# Patient Record
Sex: MALE | Race: ASIAN | NOT HISPANIC OR LATINO | Employment: FULL TIME | URBAN - METROPOLITAN AREA
[De-identification: names, ages, dates, MRNs, and addresses within clinical notes are randomized per-mention and may not be internally consistent; named-entity substitution may affect disease eponyms.]

---

## 2017-03-01 ENCOUNTER — COMMUNICATION - HEALTHEAST (OUTPATIENT)
Dept: INTERNAL MEDICINE | Facility: CLINIC | Age: 37
End: 2017-03-01

## 2017-03-08 ENCOUNTER — AMBULATORY - HEALTHEAST (OUTPATIENT)
Dept: LAB | Facility: CLINIC | Age: 37
End: 2017-03-08

## 2017-03-08 DIAGNOSIS — E55.9 VITAMIN D DEFICIENCY: ICD-10-CM

## 2017-03-10 ENCOUNTER — COMMUNICATION - HEALTHEAST (OUTPATIENT)
Dept: INTERNAL MEDICINE | Facility: CLINIC | Age: 37
End: 2017-03-10

## 2017-11-18 ENCOUNTER — RECORDS - HEALTHEAST (OUTPATIENT)
Dept: ADMINISTRATIVE | Facility: OTHER | Age: 37
End: 2017-11-18

## 2017-11-29 ENCOUNTER — OFFICE VISIT - HEALTHEAST (OUTPATIENT)
Dept: INTERNAL MEDICINE | Facility: CLINIC | Age: 37
End: 2017-11-29

## 2017-11-29 DIAGNOSIS — R07.9 CHEST PAIN: ICD-10-CM

## 2017-11-29 DIAGNOSIS — E78.1 PURE HYPERGLYCERIDEMIA: ICD-10-CM

## 2017-11-29 LAB
CHOLEST SERPL-MCNC: 223 MG/DL
FASTING STATUS PATIENT QL REPORTED: YES
HDLC SERPL-MCNC: 44 MG/DL
LDLC SERPL CALC-MCNC: 130 MG/DL
TRIGL SERPL-MCNC: 243 MG/DL

## 2017-11-29 ASSESSMENT — MIFFLIN-ST. JEOR: SCORE: 1643.82

## 2017-11-30 ENCOUNTER — COMMUNICATION - HEALTHEAST (OUTPATIENT)
Dept: INTERNAL MEDICINE | Facility: CLINIC | Age: 37
End: 2017-11-30

## 2017-11-30 DIAGNOSIS — E78.5 HYPERLIPIDEMIA: ICD-10-CM

## 2017-12-04 ENCOUNTER — COMMUNICATION - HEALTHEAST (OUTPATIENT)
Dept: INTERNAL MEDICINE | Facility: CLINIC | Age: 37
End: 2017-12-04

## 2017-12-04 DIAGNOSIS — E78.5 HYPERLIPIDEMIA: ICD-10-CM

## 2017-12-06 ENCOUNTER — HOSPITAL ENCOUNTER (OUTPATIENT)
Dept: NUCLEAR MEDICINE | Facility: CLINIC | Age: 37
Discharge: HOME | End: 2017-12-06
Attending: INTERNAL MEDICINE

## 2017-12-06 ENCOUNTER — HOSPITAL ENCOUNTER (OUTPATIENT)
Dept: CARDIOLOGY | Facility: CLINIC | Age: 37
Discharge: HOME | End: 2017-12-06
Attending: INTERNAL MEDICINE

## 2017-12-06 DIAGNOSIS — R07.9 CHEST PAIN: ICD-10-CM

## 2017-12-06 LAB
CV STRESS CURRENT BP HE: NORMAL
CV STRESS CURRENT HR HE: 100
CV STRESS CURRENT HR HE: 102
CV STRESS CURRENT HR HE: 102
CV STRESS CURRENT HR HE: 104
CV STRESS CURRENT HR HE: 108
CV STRESS CURRENT HR HE: 108
CV STRESS CURRENT HR HE: 115
CV STRESS CURRENT HR HE: 119
CV STRESS CURRENT HR HE: 120
CV STRESS CURRENT HR HE: 120
CV STRESS CURRENT HR HE: 124
CV STRESS CURRENT HR HE: 133
CV STRESS CURRENT HR HE: 140
CV STRESS CURRENT HR HE: 141
CV STRESS CURRENT HR HE: 142
CV STRESS CURRENT HR HE: 143
CV STRESS CURRENT HR HE: 149
CV STRESS CURRENT HR HE: 155
CV STRESS CURRENT HR HE: 155
CV STRESS CURRENT HR HE: 164
CV STRESS CURRENT HR HE: 166
CV STRESS CURRENT HR HE: 166
CV STRESS CURRENT HR HE: 171
CV STRESS CURRENT HR HE: 172
CV STRESS CURRENT HR HE: 172
CV STRESS CURRENT HR HE: 57
CV STRESS CURRENT HR HE: 63
CV STRESS CURRENT HR HE: 84
CV STRESS CURRENT HR HE: 91
CV STRESS CURRENT HR HE: 94
CV STRESS CURRENT HR HE: 99
CV STRESS DEVIATION TIME HE: NORMAL
CV STRESS ECHO PERCENT HR HE: NORMAL
CV STRESS EXERCISE STAGE HE: NORMAL
CV STRESS EXERCISE STAGE REACHED HE: NORMAL
CV STRESS FINAL RESTING BP HE: NORMAL
CV STRESS FINAL RESTING HR HE: 100
CV STRESS MAX HR HE: 172
CV STRESS MAX TREADMILL GRADE HE: 18
CV STRESS MAX TREADMILL SPEED HE: 5
CV STRESS PEAK DIA BP HE: NORMAL
CV STRESS PEAK SYS BP HE: NORMAL
CV STRESS PHASE HE: NORMAL
CV STRESS PROTOCOL HE: NORMAL
CV STRESS RESTING PT POSITION HE: NORMAL
CV STRESS ST DEVIATION AMOUNT HE: NORMAL
CV STRESS ST DEVIATION ELEVATION HE: NORMAL
CV STRESS ST EVELATION AMOUNT HE: NORMAL
CV STRESS TEST TYPE HE: NORMAL
CV STRESS TOTAL STAGE TIME MIN 1 HE: NORMAL
NUC STRESS EJECTION FRACTION: 70 %
STRESS ECHO BASELINE BP: NORMAL
STRESS ECHO BASELINE HR: 65
STRESS ECHO CALCULATED PERCENT HR: 95 %
STRESS ECHO LAST STRESS BP: NORMAL
STRESS ECHO LAST STRESS HR: 172
STRESS ECHO POST ESTIMATED WORKLOAD: 12.3
STRESS ECHO POST EXERCISE DUR MIN: 12
STRESS ECHO POST EXERCISE DUR SEC: 0
STRESS ECHO TARGET HR: 155

## 2017-12-07 ENCOUNTER — COMMUNICATION - HEALTHEAST (OUTPATIENT)
Dept: INTERNAL MEDICINE | Facility: CLINIC | Age: 37
End: 2017-12-07

## 2018-02-05 ENCOUNTER — COMMUNICATION - HEALTHEAST (OUTPATIENT)
Dept: INTERNAL MEDICINE | Facility: CLINIC | Age: 38
End: 2018-02-05

## 2018-02-05 DIAGNOSIS — E78.1 PURE HYPERGLYCERIDEMIA: ICD-10-CM

## 2018-02-16 ENCOUNTER — AMBULATORY - HEALTHEAST (OUTPATIENT)
Dept: LAB | Facility: CLINIC | Age: 38
End: 2018-02-16

## 2018-02-16 DIAGNOSIS — E78.5 HYPERLIPIDEMIA: ICD-10-CM

## 2018-02-16 LAB
ALBUMIN SERPL-MCNC: 4.4 G/DL (ref 3.5–5)
ALP SERPL-CCNC: 41 U/L (ref 45–120)
ALT SERPL W P-5'-P-CCNC: 24 U/L (ref 0–45)
AST SERPL W P-5'-P-CCNC: 17 U/L (ref 0–40)
BILIRUB DIRECT SERPL-MCNC: 0.2 MG/DL
BILIRUB SERPL-MCNC: 0.7 MG/DL (ref 0–1)
CHOLEST SERPL-MCNC: 151 MG/DL
FASTING STATUS PATIENT QL REPORTED: YES
HDLC SERPL-MCNC: 54 MG/DL
LDLC SERPL CALC-MCNC: 84 MG/DL
PROT SERPL-MCNC: 7.3 G/DL (ref 6–8)
TRIGL SERPL-MCNC: 64 MG/DL

## 2018-02-27 ENCOUNTER — COMMUNICATION - HEALTHEAST (OUTPATIENT)
Dept: INTERNAL MEDICINE | Facility: CLINIC | Age: 38
End: 2018-02-27

## 2018-11-19 ENCOUNTER — COMMUNICATION - HEALTHEAST (OUTPATIENT)
Dept: INTERNAL MEDICINE | Facility: CLINIC | Age: 38
End: 2018-11-19

## 2018-11-19 DIAGNOSIS — E78.1 PURE HYPERGLYCERIDEMIA: ICD-10-CM

## 2019-01-23 ENCOUNTER — COMMUNICATION - HEALTHEAST (OUTPATIENT)
Dept: INTERNAL MEDICINE | Facility: CLINIC | Age: 39
End: 2019-01-23

## 2019-01-23 DIAGNOSIS — E78.5 HYPERLIPIDEMIA: ICD-10-CM

## 2019-02-27 ENCOUNTER — COMMUNICATION - HEALTHEAST (OUTPATIENT)
Dept: INTERNAL MEDICINE | Facility: CLINIC | Age: 39
End: 2019-02-27

## 2019-02-27 DIAGNOSIS — E78.1 PURE HYPERGLYCERIDEMIA: ICD-10-CM

## 2019-06-13 ENCOUNTER — COMMUNICATION - HEALTHEAST (OUTPATIENT)
Dept: INTERNAL MEDICINE | Facility: CLINIC | Age: 39
End: 2019-06-13

## 2019-06-14 ENCOUNTER — COMMUNICATION - HEALTHEAST (OUTPATIENT)
Dept: INTERNAL MEDICINE | Facility: CLINIC | Age: 39
End: 2019-06-14

## 2019-06-14 DIAGNOSIS — E78.5 HYPERLIPIDEMIA: ICD-10-CM

## 2019-06-17 ENCOUNTER — COMMUNICATION - HEALTHEAST (OUTPATIENT)
Dept: INTERNAL MEDICINE | Facility: CLINIC | Age: 39
End: 2019-06-17

## 2019-06-17 DIAGNOSIS — E78.5 HYPERLIPIDEMIA: ICD-10-CM

## 2019-06-25 ENCOUNTER — OFFICE VISIT - HEALTHEAST (OUTPATIENT)
Dept: INTERNAL MEDICINE | Facility: CLINIC | Age: 39
End: 2019-06-25

## 2019-06-25 DIAGNOSIS — E55.9 VITAMIN D DEFICIENCY: ICD-10-CM

## 2019-06-25 DIAGNOSIS — Z23 NEED FOR VACCINATION: ICD-10-CM

## 2019-06-25 DIAGNOSIS — Z00.00 ROUTINE GENERAL MEDICAL EXAMINATION AT A HEALTH CARE FACILITY: ICD-10-CM

## 2019-06-25 DIAGNOSIS — E78.1 PURE HYPERGLYCERIDEMIA: ICD-10-CM

## 2019-06-25 LAB
ALBUMIN SERPL-MCNC: 4.4 G/DL (ref 3.5–5)
ALP SERPL-CCNC: 38 U/L (ref 45–120)
ALT SERPL W P-5'-P-CCNC: 35 U/L (ref 0–45)
ANION GAP SERPL CALCULATED.3IONS-SCNC: 13 MMOL/L (ref 5–18)
AST SERPL W P-5'-P-CCNC: 21 U/L (ref 0–40)
BILIRUB SERPL-MCNC: 0.6 MG/DL (ref 0–1)
BUN SERPL-MCNC: 22 MG/DL (ref 8–22)
CALCIUM SERPL-MCNC: 9.5 MG/DL (ref 8.5–10.5)
CHLORIDE BLD-SCNC: 106 MMOL/L (ref 98–107)
CHOLEST SERPL-MCNC: 218 MG/DL
CO2 SERPL-SCNC: 23 MMOL/L (ref 22–31)
CREAT SERPL-MCNC: 1.13 MG/DL (ref 0.7–1.3)
FASTING STATUS PATIENT QL REPORTED: YES
GFR SERPL CREATININE-BSD FRML MDRD: >60 ML/MIN/1.73M2
GLUCOSE BLD-MCNC: 100 MG/DL (ref 70–125)
HDLC SERPL-MCNC: 52 MG/DL
LDLC SERPL CALC-MCNC: 136 MG/DL
POTASSIUM BLD-SCNC: 4.2 MMOL/L (ref 3.5–5)
PROT SERPL-MCNC: 7.1 G/DL (ref 6–8)
SODIUM SERPL-SCNC: 142 MMOL/L (ref 136–145)
TRIGL SERPL-MCNC: 151 MG/DL
TSH SERPL DL<=0.005 MIU/L-ACNC: 0.96 UIU/ML (ref 0.3–5)

## 2019-06-25 ASSESSMENT — MIFFLIN-ST. JEOR: SCORE: 1666.5

## 2019-06-26 LAB
25(OH)D3 SERPL-MCNC: 33.1 NG/ML (ref 30–80)
25(OH)D3 SERPL-MCNC: 33.1 NG/ML (ref 30–80)

## 2019-06-28 ENCOUNTER — COMMUNICATION - HEALTHEAST (OUTPATIENT)
Dept: INTERNAL MEDICINE | Facility: CLINIC | Age: 39
End: 2019-06-28

## 2019-06-28 DIAGNOSIS — E78.5 HYPERLIPIDEMIA: ICD-10-CM

## 2019-06-28 DIAGNOSIS — E78.1 PURE HYPERGLYCERIDEMIA: ICD-10-CM

## 2019-07-02 ENCOUNTER — COMMUNICATION - HEALTHEAST (OUTPATIENT)
Dept: INTERNAL MEDICINE | Facility: CLINIC | Age: 39
End: 2019-07-02

## 2019-07-10 ENCOUNTER — COMMUNICATION - HEALTHEAST (OUTPATIENT)
Dept: INTERNAL MEDICINE | Facility: CLINIC | Age: 39
End: 2019-07-10

## 2020-06-30 ENCOUNTER — OFFICE VISIT - HEALTHEAST (OUTPATIENT)
Dept: INTERNAL MEDICINE | Facility: CLINIC | Age: 40
End: 2020-06-30

## 2020-06-30 DIAGNOSIS — E78.1 PURE HYPERGLYCERIDEMIA: ICD-10-CM

## 2020-06-30 DIAGNOSIS — R74.01 NONSPECIFIC ELEVATION OF LEVELS OF TRANSAMINASE OR LACTIC ACID DEHYDROGENASE (LDH): ICD-10-CM

## 2020-06-30 DIAGNOSIS — R74.02 NONSPECIFIC ELEVATION OF LEVELS OF TRANSAMINASE OR LACTIC ACID DEHYDROGENASE (LDH): ICD-10-CM

## 2020-06-30 RX ORDER — LORATADINE 10 MG/1
10 TABLET ORAL DAILY
Status: SHIPPED | COMMUNITY
Start: 2020-06-30 | End: 2022-03-24

## 2020-07-02 ENCOUNTER — AMBULATORY - HEALTHEAST (OUTPATIENT)
Dept: LAB | Facility: CLINIC | Age: 40
End: 2020-07-02

## 2020-07-02 DIAGNOSIS — R74.02 NONSPECIFIC ELEVATION OF LEVELS OF TRANSAMINASE OR LACTIC ACID DEHYDROGENASE (LDH): ICD-10-CM

## 2020-07-02 DIAGNOSIS — E78.1 PURE HYPERGLYCERIDEMIA: ICD-10-CM

## 2020-07-02 DIAGNOSIS — R74.01 NONSPECIFIC ELEVATION OF LEVELS OF TRANSAMINASE OR LACTIC ACID DEHYDROGENASE (LDH): ICD-10-CM

## 2020-07-02 LAB
ALBUMIN SERPL-MCNC: 4.7 G/DL (ref 3.5–5)
ALP SERPL-CCNC: 36 U/L (ref 45–120)
ALT SERPL W P-5'-P-CCNC: 68 U/L (ref 0–45)
AST SERPL W P-5'-P-CCNC: 31 U/L (ref 0–40)
BILIRUB DIRECT SERPL-MCNC: 0.3 MG/DL
BILIRUB SERPL-MCNC: 0.7 MG/DL (ref 0–1)
CHOLEST SERPL-MCNC: 164 MG/DL
FASTING STATUS PATIENT QL REPORTED: YES
HDLC SERPL-MCNC: 54 MG/DL
LDLC SERPL CALC-MCNC: 78 MG/DL
PROT SERPL-MCNC: 7.5 G/DL (ref 6–8)
TRIGL SERPL-MCNC: 159 MG/DL

## 2020-07-07 ENCOUNTER — COMMUNICATION - HEALTHEAST (OUTPATIENT)
Dept: INTERNAL MEDICINE | Facility: CLINIC | Age: 40
End: 2020-07-07

## 2020-08-29 ENCOUNTER — COMMUNICATION - HEALTHEAST (OUTPATIENT)
Dept: INTERNAL MEDICINE | Facility: CLINIC | Age: 40
End: 2020-08-29

## 2020-08-29 DIAGNOSIS — E78.5 HYPERLIPIDEMIA: ICD-10-CM

## 2020-09-01 RX ORDER — FENOFIBRATE 200 MG/1
200 CAPSULE ORAL
Qty: 90 CAPSULE | Refills: 3 | Status: SHIPPED | OUTPATIENT
Start: 2020-09-01 | End: 2021-07-07

## 2020-09-27 ENCOUNTER — COMMUNICATION - HEALTHEAST (OUTPATIENT)
Dept: INTERNAL MEDICINE | Facility: CLINIC | Age: 40
End: 2020-09-27

## 2020-09-27 DIAGNOSIS — E78.1 PURE HYPERGLYCERIDEMIA: ICD-10-CM

## 2020-09-30 RX ORDER — ROSUVASTATIN CALCIUM 10 MG/1
10 TABLET, COATED ORAL AT BEDTIME
Qty: 90 TABLET | Refills: 2 | Status: SHIPPED | OUTPATIENT
Start: 2020-09-30 | End: 2021-07-07

## 2021-02-11 ENCOUNTER — COMMUNICATION - HEALTHEAST (OUTPATIENT)
Dept: INTERNAL MEDICINE | Facility: CLINIC | Age: 41
End: 2021-02-11

## 2021-02-11 DIAGNOSIS — Z30.09 VASECTOMY EVALUATION: ICD-10-CM

## 2021-05-14 ENCOUNTER — RECORDS - HEALTHEAST (OUTPATIENT)
Dept: ADMINISTRATIVE | Facility: OTHER | Age: 41
End: 2021-05-14

## 2021-05-29 NOTE — TELEPHONE ENCOUNTER
Jared Matson for refill requested below?  Refill has been set up for you to review.  Eve KEARNEY, MORALES/CMT....................7:37 AM

## 2021-05-29 NOTE — TELEPHONE ENCOUNTER
You may give him 30 days of medication but he needs to have fasting follow-up with me before any further refills as he has not been seen in 2 years.

## 2021-05-30 NOTE — PROGRESS NOTES
Office Visit - Physical   Erich Pierce   38 y.o.  male    Date of visit: 6/25/2019  Physician: Damaso Harris MD     Assessment and Plan   1. Routine general medical examination at a health care facility  Due for labs today.  He lives a healthy lifestyle for the most part.  Urged increased exercise.  It is difficult for him with the kids though.  He is going to try to make it more of a priority.  - Lipid Cascade  - Comprehensive Metabolic Panel  - Thyroid Cascade  - Vitamin D, Total (25-Hydroxy)    2. Need for vaccination  Tetanus today updated.  - Tdap vaccine,  8yo or older,  IM    3. Essential Hypertriglyceridemia  Labs today for monitoring.  Continue dual therapy.  - Lipid Cascade  - Comprehensive Metabolic Panel  - Thyroid Cascade    4. Vitamin D deficiency    - Vitamin D, Total (25-Hydroxy)        Return in about 1 year (around 6/25/2020) for Annual physical.     Chief Complaint   Chief Complaint   Patient presents with     Annual Exam     pt fasting        Patient Profile   Social History     Social History Narrative     Not on file        Past Medical History   Patient Active Problem List   Diagnosis     Essential Hypertriglyceridemia     Serum Enzyme Levels - ALT (SGPT) Elevated     Multiple food allergies     Multiple environmental allergies     Vitamin D deficiency       Past Surgical History  He has a past surgical history that includes Skin graft (1990) and [place] tooth extraction (2006).     History of Present Illness   This 38 y.o. old  Tye comes in today for physical.  Reports is doing well.  There is been doing new home in [place].  He has 2 children, older daughter is 5 almost and her name is Dayana.  Youngest is a son and he will be two soon.  His name is [employer].  He continues to work for [employer].  He feels well.  Not exercising as much as he used to.  Nothing new in the family.  His sister lives in [place].  He states that she and him are very different people and do not contact each  "other much.  Mother is still in [place].  He rarely sees her as well.    His in-laws are the [employer].        Review of Systems: A comprehensive review of systems was negative except as noted.     Medications and Allergies   Current Outpatient Medications   Medication Sig Dispense Refill     aspirin 81 MG EC tablet Take 81 mg by mouth daily.       fenofibrate micronized (LOFIBRA) 200 MG capsule Take 1 capsule (200 mg total) by mouth daily before breakfast. 30 capsule 0     fluticasone (FLONASE) 50 mcg/actuation nasal spray 2 sprays.       rosuvastatin (CRESTOR) 10 MG tablet Take 1 tablet (10 mg total) by mouth at bedtime. 30 tablet 0     EPIPEN 2-JORGE 0.3 mg/0.3 mL atIn Inject into thigh as directed 1 Pre-filled Pen Syringe 1     No current facility-administered medications for this visit.      No Known Allergies     Family and Social History   Family History   Problem Relation Age of Onset     Leukemia Father          age 64 of acute myelogenous leukimia     Depression Mother      No Medical Problems Sister      No Medical Problems Daughter         Social History     Tobacco Use     Smoking status: Never Smoker     Smokeless tobacco: Never Used   Substance Use Topics     Alcohol use: Not on file     Drug use: Not on file        Physical Exam   General Appearance:   Pleasant young gentleman in no distress.  Looks well and in good spirits.    /70   Pulse 73   Ht 5' 7\" (1.702 m)   Wt 177 lb (80.3 kg)   SpO2 96%   BMI 27.72 kg/m      EYES: Eyelids, conjunctiva, and sclera were normal. Pupils were normal. Cornea, iris, and lens were normal bilaterally.  HEAD, EARS, NOSE, MOUTH, AND THROAT: Head and face were normal. Hearing was normal to voice and the ears were normal to external exam. Nose appearance was normal and there was no discharge. Oropharynx was normal.  NECK: Neck appearance was normal. There were no neck masses and the thyroid was not enlarged.  RESPIRATORY: Breathing pattern was normal " and the chest moved symmetrically.  Percussion/auscultatory percussion was normal.  Lung sounds were normal and there were no abnormal sounds.  CARDIOVASCULAR: Heart rate and rhythm were normal.  S1 and S2 were normal and there were no extra sounds or murmurs. Peripheral pulses in arms and legs were normal.  Jugular venous pressure was normal.  There was no peripheral edema.  GASTROINTESTINAL: The abdomen was normal in contour.  Bowel sounds were present.  Percussion detected no organ enlargement or tenderness.  Palpation detected no tenderness, mass, or enlarged organs.   MUSCULOSKELETAL: Skeletal configuration was normal and muscle mass was normal for age. Joint appearance was overall normal.  LYMPHATIC: There were no enlarged nodes.  SKIN/HAIR/NAILS: Skin color was normal.  There were no skin lesions.  Hair and nails were normal.  NEUROLOGIC: The patient was alert and oriented to person, place, time, and circumstance. Speech was normal. Cranial nerves were normal. Motor strength was normal for age. The patient was normally coordinated.  PSYCHIATRIC:  Mood and affect were normal and the patient had normal recent and remote memory. The patient's judgment and insight were normal.    ADDITIONAL VITAL SIGNS: none  CHEST WALL/BREASTS: nml    RECTAL: def  GENITAL/URINARY: nml penis and testes.      Additional Information        Damaso Harris MD  Internal Medicine  Contact me at [phone]

## 2021-05-30 NOTE — TELEPHONE ENCOUNTER
Refill Approved    Rx renewed per Medication Renewal Policy. Medication was last renewed on 2/27/19.    Janett Lam, [facility] Triage/Med Refill 6/29/2019     Requested Prescriptions   Pending Prescriptions Disp Refills     rosuvastatin (CRESTOR) 10 MG tablet 30 tablet 0     Sig: Take 1 tablet (10 mg total) by mouth at bedtime.       Statins Refill Protocol (Hmg CoA Reductase Inhibitors) Passed - 6/28/2019  7:55 PM        Passed - PCP or prescribing provider visit in past 12 months      Last office visit with prescriber/PCP: 11/29/2017 Damaso Harris MD OR same dept: Visit date not found OR same specialty: 11/29/2017 Damaso Harris MD  Last physical: 6/25/2019 Last MTM visit: Visit date not found   Next visit within 3 mo: Visit date not found  Next physical within 3 mo: Visit date not found  Prescriber OR PCP: Damaso Harris MD  Last diagnosis associated with med order: 1. Essential Hypertriglyceridemia  - rosuvastatin (CRESTOR) 10 MG tablet; Take 1 tablet (10 mg total) by mouth at bedtime.  Dispense: 30 tablet; Refill: 0    2. Hyperlipidemia  - fenofibrate micronized (LOFIBRA) 200 MG capsule; Take 1 capsule (200 mg total) by mouth daily before breakfast.  Dispense: 30 capsule; Refill: 0    If protocol passes may refill for 12 months if within 3 months of last provider visit (or a total of 15 months).             fenofibrate micronized (LOFIBRA) 200 MG capsule 30 capsule 0     Sig: Take 1 capsule (200 mg total) by mouth daily before breakfast.       Fenofibrate Refill Protocol Passed - 6/28/2019  7:55 PM        Passed - Renal status in last 6 months     Creatinine   Date Value Ref Range Status   06/25/2019 1.13 0.70 - 1.30 mg/dL Final             Passed - Fasting lipid cascade in last 12 months     Cholesterol   Date Value Ref Range Status   06/25/2019 218 (H) <=199 mg/dL Final     Triglycerides   Date Value Ref Range Status   06/25/2019 151 (H) <=149 mg/dL Final     HDL Cholesterol   Date Value Ref Range  Status   06/25/2019 52 >=40 mg/dL Final     LDL Calculated   Date Value Ref Range Status   06/25/2019 136 (H) <=129 mg/dL Final     Patient Fasting > 8hrs?   Date Value Ref Range Status   06/25/2019 Yes  Final             Passed - AST or ALT in last 12 months     AST   Date Value Ref Range Status   06/25/2019 21 0 - 40 U/L Final     ALT   Date Value Ref Range Status   06/25/2019 35 0 - 45 U/L Final               Passed - PCP or prescribing provider visit in past 12 months       Last office visit with prescriber/PCP: 11/29/2017 Damaso Harris MD OR same dept: Visit date not found OR same specialty: 11/29/2017 Damaso Harris MD  Last physical: 6/25/2019 Last MTM visit: Visit date not found   Next visit within 3 mo: Visit date not found  Next physical within 3 mo: Visit date not found  Prescriber OR PCP: Damaso Harris MD  Last diagnosis associated with med order: 1. Essential Hypertriglyceridemia  - rosuvastatin (CRESTOR) 10 MG tablet; Take 1 tablet (10 mg total) by mouth at bedtime.  Dispense: 30 tablet; Refill: 0    2. Hyperlipidemia  - fenofibrate micronized (LOFIBRA) 200 MG capsule; Take 1 capsule (200 mg total) by mouth daily before breakfast.  Dispense: 30 capsule; Refill: 0    If protocol passes may refill for 12 months if within 3 months of last provider visit (or a total of 15 months).

## 2021-05-31 VITALS — BODY MASS INDEX: 27 KG/M2 | HEIGHT: 67 IN | WEIGHT: 172 LBS

## 2021-06-03 ENCOUNTER — OFFICE VISIT - HEALTHEAST (OUTPATIENT)
Dept: FAMILY MEDICINE | Facility: CLINIC | Age: 41
End: 2021-06-03

## 2021-06-03 VITALS — BODY MASS INDEX: 27.78 KG/M2 | HEIGHT: 67 IN | WEIGHT: 177 LBS

## 2021-06-03 DIAGNOSIS — Z20.822 SUSPECTED COVID-19 VIRUS INFECTION: ICD-10-CM

## 2021-06-09 NOTE — PROGRESS NOTES
"Erich Pierce is a 39 y.o. male who is being evaluated via a billable video visit.      The patient has been notified of following:     \"This video visit will be conducted via a call between you and your physician/provider. We have found that certain health care needs can be provided without the need for an in-person physical exam.  This service lets us provide the care you need with a video conversation.  If a prescription is necessary we can send it directly to your pharmacy.  If lab work is needed we can place an order for that and you can then stop by our lab to have the test done at a later time.    Video visits are billed at different rates depending on your insurance coverage. Please reach out to your insurance provider with any questions.    If during the course of the call the physician/provider feels a video visit is not appropriate, you will not be charged for this service.\"    Patient has given verbal consent to a Video visit? Yes  How would you like to obtain your AVS? AVS Preference: [employer].  Patient would like the video invitation sent by: Text to cell phone: [phone]  Will anyone else be joining your video visit? No        Video Start Time: 0850    Additional provider notes:     Office Visit - Follow Up   Erich Pierce   39 y.o. male    Date of Visit: 6/30/2020    Chief Complaint   Patient presents with     Hyperlipidemia     Dox Video: [phone] med check - will likely need lab work done         Assessment and Plan   1. Essential Hypertriglyceridemia  Continue lifestyle modification and medications as prescribed.  He will return for labs.  - Lipid Cascade; Future  - Hepatic Profile; Future    2. Serum Enzyme Levels - ALT (SGPT) Elevated  As above.  I will see him back in 1 year assuming all is well.  - Lipid Cascade; Future  - Hepatic Profile; Future        Return in about 1 year (around 6/30/2021) for Annual physical.     History of Present Illness   This 39 y.o. old Tye joins me for video " visit today.  Reports has been doing well.  He and his wife are expecting their third.  The kids are in .  He and wife are both working from home.  Denies somatic concerns.  Remains on his lipid-lowering medications.  No side effects from them.    Review of Systems: A comprehensive review of systems was negative except as noted.     Medications, Allergies and Problem List   Reviewed, reconciled and updated  Post Discharge Medication Reconciliation Status:      Physical Exam   General Appearance:   Pleasant gentleman who looks his age or younger.    There were no vitals taken for this visit.         Additional Information   Current Outpatient Medications   Medication Sig Dispense Refill     cholecalciferol, vitamin D3, 125 mcg (5,000 unit) capsule Take 5,000 Units by mouth daily.       EPIPEN 2-JORGE 0.3 mg/0.3 mL atIn Inject into thigh as directed 1 Pre-filled Pen Syringe 1     fenofibrate micronized (LOFIBRA) 200 MG capsule Take 1 capsule (200 mg total) by mouth daily before breakfast. 90 capsule 3     loratadine (CLARITIN) 10 mg tablet Take 10 mg by mouth daily.       rosuvastatin (CRESTOR) 10 MG tablet Take 1 tablet (10 mg total) by mouth at bedtime. 90 tablet 3     No current facility-administered medications for this visit.      No Known Allergies  Social History     Tobacco Use     Smoking status: Never Smoker     Smokeless tobacco: Never Used   Substance Use Topics     Alcohol use: Not on file     Drug use: Not on file       Review and/or order of clinical lab tests:  Review and/or order of radiology tests:  Review and/or order of medicine tests:  Discussion of test results with performing physician:  Decision to obtain old records and/or obtain history from someone other than the patient:  Review and summarization of old records and/or obtaining history from someone other than the patient and.or discussion of case with another health care provider:  Independent visualization of image, tracing or  specimen itself:    Time: not applicable     Damaso Harris MD      Video-Visit Details    Type of service:  Video Visit    Video End Time (time video stopped): 0901  Originating Location (pt. Location): Home    Distant Location (provider location):  [facility] INTERNAL MEDICINE     Platform used for Video Visit: [facility]      Damaso Harris MD

## 2021-06-11 NOTE — TELEPHONE ENCOUNTER
RN cannot approve Refill Request    RN can NOT refill this medication Protocol failed and NO refill given. Last office visit: 11/29/2017 Damaso Harris MD Last Physical: 6/25/2019 Last MTM visit: Visit date not found Last visit same specialty: 11/29/2017 Damaso Harris MD.  Next visit within 3 mo: Visit date not found  Next physical within 3 mo: Visit date not found      Janett Lam, [facility] Triage/Med Refill 8/31/2020    Requested Prescriptions   Pending Prescriptions Disp Refills     fenofibrate micronized (LOFIBRA) 200 MG capsule [Pharmacy Med Name: Fenofibrate Micronized Oral Capsule 200 MG] 90 capsule 0     Sig: Take 1 capsule (200 mg total) by mouth daily before breakfast.       Fenofibrate Refill Protocol Failed - 8/29/2020  2:02 AM        Failed - Renal status in last 6 months     Creatinine   Date Value Ref Range Status   06/25/2019 1.13 0.70 - 1.30 mg/dL Final             Passed - Fasting lipid cascade in last 12 months     Cholesterol   Date Value Ref Range Status   07/02/2020 164 <=199 mg/dL Final     Triglycerides   Date Value Ref Range Status   07/02/2020 159 (H) <=149 mg/dL Final     HDL Cholesterol   Date Value Ref Range Status   07/02/2020 54 >=40 mg/dL Final     LDL Calculated   Date Value Ref Range Status   07/02/2020 78 <=129 mg/dL Final     Patient Fasting > 8hrs?   Date Value Ref Range Status   07/02/2020 Yes  Final             Passed - AST or ALT in last 12 months     AST   Date Value Ref Range Status   07/02/2020 31 0 - 40 U/L Final     ALT   Date Value Ref Range Status   07/02/2020 68 (H) 0 - 45 U/L Final               Passed - PCP or prescribing provider visit in past 12 months       Last office visit with prescriber/PCP: 11/29/2017 Damaso Harris MD OR same dept: Visit date not found OR same specialty: 11/29/2017 Damaso Harris MD  Last physical: 6/25/2019 Last MTM visit: Visit date not found   Next visit within 3 mo: Visit date not found  Next physical within 3 mo: Visit  date not found  Prescriber OR PCP: Damaso Harris MD  Last diagnosis associated with med order: 1. Hyperlipidemia  - fenofibrate micronized (LOFIBRA) 200 MG capsule [Pharmacy Med Name: Fenofibrate Micronized Oral Capsule 200 MG]; Take 1 capsule (200 mg total) by mouth daily before breakfast.  Dispense: 90 capsule; Refill: 0    If protocol passes may refill for 12 months if within 3 months of last provider visit (or a total of 15 months).

## 2021-06-11 NOTE — TELEPHONE ENCOUNTER
Refill Approved    Rx renewed per Medication Renewal Policy. Medication was last renewed on 6/29/9.    Janett Lam, [facility] Triage/Med Refill 9/30/2020     Requested Prescriptions   Pending Prescriptions Disp Refills     rosuvastatin (CRESTOR) 10 MG tablet [Pharmacy Med Name: Rosuvastatin Calcium Oral Tablet 10 MG] 90 tablet 0     Sig: Take 1 tablet (10 mg total) by mouth at bedtime.       Statins Refill Protocol (Hmg CoA Reductase Inhibitors) Passed - 9/27/2020 12:40 PM        Passed - PCP or prescribing provider visit in past 12 months      Last office visit with prescriber/PCP: 11/29/2017 Damaso Harris MD OR same dept: Visit date not found OR same specialty: 11/29/2017 Damaso Harris MD  Last physical: 6/25/2019 Last MTM visit: Visit date not found   Next visit within 3 mo: Visit date not found  Next physical within 3 mo: Visit date not found  Prescriber OR PCP: Damaso Harris MD  Last diagnosis associated with med order: 1. Essential Hypertriglyceridemia  - rosuvastatin (CRESTOR) 10 MG tablet [Pharmacy Med Name: Rosuvastatin Calcium Oral Tablet 10 MG]; Take 1 tablet (10 mg total) by mouth at bedtime.  Dispense: 90 tablet; Refill: 0    If protocol passes may refill for 12 months if within 3 months of last provider visit (or a total of 15 months).

## 2021-06-14 NOTE — PROGRESS NOTES
"  Office Visit - Follow Up   Erich Pierce   37 y.o. male    Date of Visit: 2017    Chief Complaint   Patient presents with     Hospital Visit Follow Up     ER f/u - no current ang since being home         Assessment and Plan   1. Chest pain  Chest pains in this gentleman with cardiac risk factors of severe hyper lipidemia.  He is on dual therapy for his hyperlipidemia.  We discussed that healthy diet today and I have suggested a stress Cardiolite for further evaluation to rule out cardiac cause of his chest pain.  If he has further episodes he is to let me know immediately.  - NM Exercise Stress Test; Future    2. Essential Hypertriglyceridemia  Labs today for monitoring.  - Lipid Cascade  - Comprehensive Metabolic Panel        Return if symptoms worsen or fail to improve.     History of Present Illness   This 37 y.o. old patient comes in today for follow-up as an urgent add-on for evaluation of chest pains that he had last week.  About 10 days ago he was bending over to  his  and developed substernal chest pain.  It lasted about 12 hours.  Went to urgent care where they apparently found some abnormalities of his T waves.  They sent to emergency room.  There he had an extensive evaluation including troponin which was negative and a normal EKG.  Also normal chest x-ray.  He was sent home.  He has had no further chest pains.  He has not been exercising but he has been active.  He denies any shortness of breath or other somatic concerns.    Review of Systems: A comprehensive review of systems was negative except as noted.     Medications, Allergies and Problem List   Reviewed and updated     Physical Exam   General Appearance:   Patient looks well.  No chest pain on palpation.  Heart is regular.    /72 (Patient Site: Right Arm, Patient Position: Sitting, Cuff Size: Adult Regular)  Pulse (!) 55  Ht 5' 7\" (1.702 m)  Wt 172 lb (78 kg)  SpO2 99%  BMI 26.94 kg/m2         Additional Information "   Current Outpatient Prescriptions   Medication Sig Dispense Refill     aspirin 81 MG EC tablet Take 81 mg by mouth daily.       EPIPEN 2-JORGE 0.3 mg/0.3 mL atIn Inject into thigh as directed 1 Pre-filled Pen Syringe 1     fenofibrate micronized (LOFIBRA) 200 MG capsule Take one capsule by mouth one time daily 90 capsule 3     fluticasone (FLONASE) 50 mcg/actuation nasal spray 2 sprays.       simvastatin (ZOCOR) 10 MG tablet TAKE ONE TABLET BY MOUTH ONE TIME DAILY 90 tablet 3     No current facility-administered medications for this visit.      No Known Allergies  Social History   Substance Use Topics     Smoking status: Never Smoker     Smokeless tobacco: None     Alcohol use None       Review and/or order of clinical lab tests:  Review and/or order of radiology tests:  Review and/or order of medicine tests:  Discussion of test results with performing physician:  Decision to obtain old records and/or obtain history from someone other than the patient:  Review and summarization of old records and/or obtaining history from someone other than the patient and.or discussion of case with another health care provider:  Independent visualization of image, tracing or specimen itself:    Time: total time spent with the patient was 15 minutes of which >50% was spent in counseling and coordination of care     Damaso Harris MD

## 2021-06-15 NOTE — TELEPHONE ENCOUNTER
Please place referral to urology.  Dr. Menjivar or Dr. Bertrand.  Diagnosis is desire for vasectomy.  Please instruct him on how to get this scheduled.  Thanks!

## 2021-06-16 PROBLEM — E55.9 VITAMIN D DEFICIENCY: Status: ACTIVE | Noted: 2019-06-25

## 2021-06-19 NOTE — LETTER
Letter by Damaso Harris MD at      Author: Damaso Harris MD Service: -- Author Type: --    Filed:  Encounter Date: 7/2/2019 Status: (Other)         Erich Pierce  [address] [facility] [address]             July 2, 2019         Dear Mr. Pierce,    Below are the results from your recent visit:    Resulted Orders   Lipid Cascade   Result Value Ref Range    Cholesterol 218 (H) <=199 mg/dL    Triglycerides 151 (H) <=149 mg/dL    HDL Cholesterol 52 >=40 mg/dL    LDL Calculated 136 (H) <=129 mg/dL    Patient Fasting > 8hrs? Yes    Comprehensive Metabolic Panel   Result Value Ref Range    Sodium 142 136 - 145 mmol/L    Potassium 4.2 3.5 - 5.0 mmol/L    Chloride 106 98 - 107 mmol/L    CO2 23 22 - 31 mmol/L    Anion Gap, Calculation 13 5 - 18 mmol/L    Glucose 100 70 - 125 mg/dL    BUN 22 8 - 22 mg/dL    Creatinine 1.13 0.70 - 1.30 mg/dL    GFR MDRD Af Amer >60 >60 mL/min/1.73m2    GFR MDRD Non Af Amer >60 >60 mL/min/1.73m2    Bilirubin, Total 0.6 0.0 - 1.0 mg/dL    Calcium 9.5 8.5 - 10.5 mg/dL    Protein, Total 7.1 6.0 - 8.0 g/dL    Albumin 4.4 3.5 - 5.0 g/dL    Alkaline Phosphatase 38 (L) 45 - 120 U/L    AST 21 0 - 40 U/L    ALT 35 0 - 45 U/L    Narrative    Fasting Glucose reference range is 70-99 mg/dL per  American Diabetes Association (ADA) guidelines.   Thyroid Cascade   Result Value Ref Range    TSH 0.96 0.30 - 5.00 uIU/mL   Vitamin D, Total (25-Hydroxy)   Result Value Ref Range    Vitamin D, Total (25-Hydroxy) 33.1 30.0 - 80.0 ng/mL    Narrative    Deficiency <10.0 ng/mL  Insufficiency 10.0-29.9 ng/mL  Sufficiency 30.0-80.0 ng/mL  Toxicity (possible) >100.0 ng/mL       Tye, your cholesterol is up, likely due to your dietary changes and lack of exercise.  Please try to be better about the diet, following a healthy Mediterranean diet.  Everything else here looks very good.     Please call with questions or contact us using [employer].    Sincerely,        Electronically signed by Damaso Harris MD

## 2021-06-20 NOTE — LETTER
Letter by Damaso Harris MD at      Author: Damaso Harris MD Service: -- Author Type: --    Filed:  Encounter Date: 7/7/2020 Status: (Other)         Erich Pierce  [address] [facility] [address]             July 7, 2020         Dear Mr. Pierce,    Below are the results from your recent visit:    Resulted Orders   Lipid Cascade   Result Value Ref Range    Cholesterol 164 <=199 mg/dL    Triglycerides 159 (H) <=149 mg/dL    HDL Cholesterol 54 >=40 mg/dL    LDL Calculated 78 <=129 mg/dL    Patient Fasting > 8hrs? Yes    Hepatic Profile   Result Value Ref Range    Bilirubin, Total 0.7 0.0 - 1.0 mg/dL    Bilirubin, Direct 0.3 <=0.5 mg/dL    Protein, Total 7.5 6.0 - 8.0 g/dL    Albumin 4.7 3.5 - 5.0 g/dL    Alkaline Phosphatase 36 (L) 45 - 120 U/L    AST 31 0 - 40 U/L    ALT 68 (H) 0 - 45 U/L     Cholesterol looks great.  One minimally elevated liver enzyme not concerning.     Please call with questions or contact us using [employer].    Sincerely,        Electronically signed by Damaso Harris MD

## 2021-06-23 NOTE — TELEPHONE ENCOUNTER
RN cannot approve Refill Request    RN can NOT refill this medication Protocol failed and NO refill given. Last office visit: 11/29/2017 Damaso Harris MD Last Physical: Visit date not found Last MTM visit: Visit date not found Last visit same specialty: 11/29/2017 Damaso Harris MD.  Next visit within 3 mo: Visit date not found  Next physical within 3 mo: Visit date not found      Neetu Ibarra, [facility] Triage/Med Refill 1/25/2019    Requested Prescriptions   Pending Prescriptions Disp Refills     fenofibrate micronized (LOFIBRA) 200 MG capsule [Pharmacy Med Name: FENOFIBRATE 200 MG CAPSULE] 90 capsule 2     Sig: TAKE 1 CAPSULE (200 MG TOTAL) BY MOUTH DAILY BEFORE BREAKFAST.    Fenofibrate Refill Protocol Failed - 1/23/2019  8:25 AM       Failed - Renal status in last 6 months    Creatinine   Date Value Ref Range Status   11/29/2017 1.14 0.70 - 1.30 mg/dL Final            Failed - PCP or prescribing provider visit in past 12 months      Last office visit with prescriber/PCP: 11/29/2017 Damaso Harris MD OR same dept: Visit date not found OR same specialty: 11/29/2017 Damaso Harris MD  Last physical: Visit date not found Last MTM visit: Visit date not found   Next visit within 3 mo: Visit date not found  Next physical within 3 mo: Visit date not found  Prescriber OR PCP: Damaso Harris MD  Last diagnosis associated with med order: 1. Hyperlipidemia  - fenofibrate micronized (LOFIBRA) 200 MG capsule [Pharmacy Med Name: FENOFIBRATE 200 MG CAPSULE]; Take 1 capsule (200 mg total) by mouth daily before breakfast.  Dispense: 90 capsule; Refill: 2    If protocol passes may refill for 12 months if within 3 months of last provider visit (or a total of 15 months).            Passed - Fasting lipid cascade in last 12 months    Cholesterol   Date Value Ref Range Status   02/16/2018 151 <=199 mg/dL Final     Triglycerides   Date Value Ref Range Status   02/16/2018 64 <=149 mg/dL Final     HDL Cholesterol   Date  Value Ref Range Status   02/16/2018 54 >=40 mg/dL Final     LDL Calculated   Date Value Ref Range Status   02/16/2018 84 <=129 mg/dL Final     Patient Fasting > 8hrs?   Date Value Ref Range Status   02/16/2018 Yes  Final            Passed - AST or ALT in last 12 months    AST   Date Value Ref Range Status   02/16/2018 17 0 - 40 U/L Final     ALT   Date Value Ref Range Status   02/16/2018 24 0 - 45 U/L Final

## 2021-06-24 NOTE — TELEPHONE ENCOUNTER
RN cannot approve Refill Request    RN can NOT refill this medication PCP messaged that patient is overdue for Office Visit.     Janett Lam, [facility] Triage/Med Refill 2/27/2019    Requested Prescriptions   Pending Prescriptions Disp Refills     rosuvastatin (CRESTOR) 10 MG tablet 30 tablet 0     Sig: Take 1 tablet (10 mg total) by mouth at bedtime.    Statins Refill Protocol (Hmg CoA Reductase Inhibitors) Failed - 2/27/2019 12:28 PM       Failed - PCP or prescribing provider visit in past 12 months     Last office visit with prescriber/PCP: 11/29/2017 Damaso Harris MD OR same dept: Visit date not found OR same specialty: 11/29/2017 Damaso Harris MD  Last physical: Visit date not found Last MTM visit: Visit date not found   Next visit within 3 mo: Visit date not found  Next physical within 3 mo: Visit date not found  Prescriber OR PCP: Damaso Harris MD  Last diagnosis associated with med order: 1. Essential Hypertriglyceridemia  - rosuvastatin (CRESTOR) 10 MG tablet; Take 1 tablet (10 mg total) by mouth at bedtime.  Dispense: 30 tablet; Refill: 0    If protocol passes may refill for 12 months if within 3 months of last provider visit (or a total of 15 months).

## 2021-06-24 NOTE — TELEPHONE ENCOUNTER
Addended by: JOHAN LEIJA on: 9/20/2019 09:35 AM     Modules accepted: Orders     Left message asking patient to make a fasting follow up appointment.    Adry Wren, CMA

## 2021-06-26 NOTE — PATIENT INSTRUCTIONS - HE
Dear Erich Pierce,    Your symptoms show that you may have allergies or it could be a cold virus it even could be mild coronavirus (COVID-19). This illness can cause fever, cough and trouble breathing. Many people get a mild case and get better on their own. Some people can get very sick.     I sent in a prescription for flonase that can help with if it is allergies or even may help with a cold virus congestion as well.     As it says below we also should test you for covid.     Will I be tested for COVID-19?  We would like to test you for Covid-19 virus. I have placed orders for this test.     To schedule: go to your Network home page and scroll down to the section that says  You have an appointment that needs to be scheduled  and click the large green button that says  Schedule Now  and follow the steps to find the next available openings.    If you are unable to complete these Network scheduling steps, please call [phone] to schedule your testing.     Return to work/school/ guidance:  Please let your workplace manager and staffing office know when your quarantine ends     We can t give you an exact date as it depends on the above. You can calculate this on your own or work with your manager/staffing office to calculate this. (For example if you were exposed on 10/4, you would have to quarantine for 14 full days. That would be through 10/18. You could return on 10/19.)      If you receive a positive COVID-19 test result, follow the guidance of the those who are giving you the results. Usually the return to work is 10 (or in some cases 20 days from symptom onset.) If you work at [employer], you must also be cleared by Employee Occupational Health and Safety to return to work.        If you receive a negative COVID-19 test result and did not have a high risk exposure to someone with a known positive COVID-19 test, you can return to work once you're free of fever for 24 hours without  fever-reducing medication and your symptoms are improving or resolved.      If you receive a negative COVID-19 test and If you had a high risk exposure to someone who has tested positive for COVID-19 then you can return to work 14 days after your last contact with the positive individual    Note: If you have ongoing exposure to the covid positive person, this quarantine period may be more than 14 days. (For example, if you are continued to be exposed to your child who tested positive and cannot isolate from them, then the quarantine of 7-14 days can't start until your child is no longer contagious. This is typically 10 days from onset of the child's symptoms. So the total duration may be 17-24 days in this case.)    Sign up for [employer].   We know it's scary to hear that you might have COVID-19. We want to track your symptoms to make sure you're okay over the next 2 weeks. Please look for an email from [employer]--this is a free, online program that we'll use to keep in touch. To sign up, follow the link in the email you will receive. Learn more at [email or web address]    How can I take care of myself?    Get lots of rest. Drink extra fluids (unless a doctor has told you not to)    Take Tylenol (acetaminophen) or ibuprofen for fever or pain. If you have liver or kidney problems, ask your family doctor if it's okay to take Tylenol o ibuprofen    If you have other health problems (like cancer, heart failure, an organ transplant or severe kidney disease): Call your specialty clinic if you don't feel better in the next 2 days.    Know when to call 911. Emergency warning signs include:  o Trouble breathing or shortness of breath  o Pain or pressure in the chest that doesn't go away  o Feeling confused like you haven't felt before, or not being able to wake up  o Bluish-colored lips or face    Where can I get more information?  [facility] - About COVID-19:    [email or web address]    CDC - What to Do If You're Sick:   www.cdc.gov/coronavirus/2019-ncov/about/steps-when-sick.html

## 2021-06-27 ENCOUNTER — HEALTH MAINTENANCE LETTER (OUTPATIENT)
Age: 41
End: 2021-06-27

## 2021-07-07 ENCOUNTER — RECORDS - HEALTHEAST (OUTPATIENT)
Dept: ADMINISTRATIVE | Facility: OTHER | Age: 41
End: 2021-07-07

## 2021-07-14 ENCOUNTER — TRANSFERRED RECORDS (OUTPATIENT)
Dept: HEALTH INFORMATION MANAGEMENT | Facility: CLINIC | Age: 41
End: 2021-07-14

## 2021-10-17 ENCOUNTER — HEALTH MAINTENANCE LETTER (OUTPATIENT)
Age: 41
End: 2021-10-17

## 2022-03-17 ENCOUNTER — HOSPITAL ENCOUNTER (INPATIENT)
Facility: CLINIC | Age: 42
LOS: 1 days | Discharge: HOME | End: 2022-03-19
Attending: STUDENT IN AN ORGANIZED HEALTH CARE EDUCATION/TRAINING PROGRAM | Admitting: SURGERY
Payer: COMMERCIAL

## 2022-03-17 DIAGNOSIS — K35.80 ACUTE APPENDICITIS, UNSPECIFIED ACUTE APPENDICITIS TYPE: ICD-10-CM

## 2022-03-17 DIAGNOSIS — Z11.52 ENCOUNTER FOR SCREENING LABORATORY TESTING FOR SEVERE ACUTE RESPIRATORY SYNDROME CORONAVIRUS 2 (SARS-COV-2): ICD-10-CM

## 2022-03-17 DIAGNOSIS — R10.9 ACUTE ABDOMINAL PAIN: Primary | ICD-10-CM

## 2022-03-17 PROCEDURE — C9803 HOPD COVID-19 SPEC COLLECT: HCPCS | Performed by: STUDENT IN AN ORGANIZED HEALTH CARE EDUCATION/TRAINING PROGRAM

## 2022-03-17 PROCEDURE — 99285 EMERGENCY DEPT VISIT HI MDM: CPT | Performed by: STUDENT IN AN ORGANIZED HEALTH CARE EDUCATION/TRAINING PROGRAM

## 2022-03-17 PROCEDURE — 99285 EMERGENCY DEPT VISIT HI MDM: CPT | Mod: 25 | Performed by: STUDENT IN AN ORGANIZED HEALTH CARE EDUCATION/TRAINING PROGRAM

## 2022-03-18 ENCOUNTER — APPOINTMENT (OUTPATIENT)
Dept: GENERAL RADIOLOGY | Facility: CLINIC | Age: 42
End: 2022-03-18
Attending: STUDENT IN AN ORGANIZED HEALTH CARE EDUCATION/TRAINING PROGRAM
Payer: COMMERCIAL

## 2022-03-18 PROBLEM — R10.9 ACUTE ABDOMINAL PAIN: Status: ACTIVE | Noted: 2022-03-18

## 2022-03-18 PROBLEM — K35.80 ACUTE APPENDICITIS, UNSPECIFIED ACUTE APPENDICITIS TYPE: Status: ACTIVE | Noted: 2022-03-18

## 2022-03-18 LAB
MAGNESIUM SERPL-MCNC: 2 MG/DL (ref 1.6–2.3)
POTASSIUM BLD-SCNC: 3.6 MMOL/L (ref 3.4–5.3)
SARS-COV-2 RNA RESP QL NAA+PROBE: NEGATIVE

## 2022-03-18 PROCEDURE — 96367 TX/PROPH/DG ADDL SEQ IV INF: CPT | Performed by: STUDENT IN AN ORGANIZED HEALTH CARE EDUCATION/TRAINING PROGRAM

## 2022-03-18 PROCEDURE — 250N000013 HC RX MED GY IP 250 OP 250 PS 637: Performed by: STUDENT IN AN ORGANIZED HEALTH CARE EDUCATION/TRAINING PROGRAM

## 2022-03-18 PROCEDURE — 99253 IP/OBS CNSLTJ NEW/EST LOW 45: CPT | Mod: GC | Performed by: SURGERY

## 2022-03-18 PROCEDURE — 999N000122 CT OUTSIDE READ

## 2022-03-18 PROCEDURE — 96365 THER/PROPH/DIAG IV INF INIT: CPT | Performed by: STUDENT IN AN ORGANIZED HEALTH CARE EDUCATION/TRAINING PROGRAM

## 2022-03-18 PROCEDURE — 36415 COLL VENOUS BLD VENIPUNCTURE: CPT | Performed by: STUDENT IN AN ORGANIZED HEALTH CARE EDUCATION/TRAINING PROGRAM

## 2022-03-18 PROCEDURE — 84132 ASSAY OF SERUM POTASSIUM: CPT | Performed by: STUDENT IN AN ORGANIZED HEALTH CARE EDUCATION/TRAINING PROGRAM

## 2022-03-18 PROCEDURE — 96366 THER/PROPH/DIAG IV INF ADDON: CPT | Performed by: STUDENT IN AN ORGANIZED HEALTH CARE EDUCATION/TRAINING PROGRAM

## 2022-03-18 PROCEDURE — 120N000002 HC R&B MED SURG/OB UMMC

## 2022-03-18 PROCEDURE — 83735 ASSAY OF MAGNESIUM: CPT | Performed by: STUDENT IN AN ORGANIZED HEALTH CARE EDUCATION/TRAINING PROGRAM

## 2022-03-18 PROCEDURE — 258N000003 HC RX IP 258 OP 636: Performed by: STUDENT IN AN ORGANIZED HEALTH CARE EDUCATION/TRAINING PROGRAM

## 2022-03-18 PROCEDURE — 250N000011 HC RX IP 250 OP 636: Performed by: STUDENT IN AN ORGANIZED HEALTH CARE EDUCATION/TRAINING PROGRAM

## 2022-03-18 PROCEDURE — U0003 INFECTIOUS AGENT DETECTION BY NUCLEIC ACID (DNA OR RNA); SEVERE ACUTE RESPIRATORY SYNDROME CORONAVIRUS 2 (SARS-COV-2) (CORONAVIRUS DISEASE [COVID-19]), AMPLIFIED PROBE TECHNIQUE, MAKING USE OF HIGH THROUGHPUT TECHNOLOGIES AS DESCRIBED BY CMS-2020-01-R: HCPCS | Performed by: STUDENT IN AN ORGANIZED HEALTH CARE EDUCATION/TRAINING PROGRAM

## 2022-03-18 RX ORDER — LIDOCAINE 40 MG/G
CREAM TOPICAL
Status: DISCONTINUED | OUTPATIENT
Start: 2022-03-18 | End: 2022-03-19 | Stop reason: HOSPADM

## 2022-03-18 RX ORDER — DEXTROSE MONOHYDRATE, SODIUM CHLORIDE, AND POTASSIUM CHLORIDE 50; 1.49; 4.5 G/1000ML; G/1000ML; G/1000ML
INJECTION, SOLUTION INTRAVENOUS CONTINUOUS
Status: DISCONTINUED | OUTPATIENT
Start: 2022-03-18 | End: 2022-03-19 | Stop reason: HOSPADM

## 2022-03-18 RX ORDER — OXYCODONE HYDROCHLORIDE 5 MG/1
5 TABLET ORAL
Status: DISCONTINUED | OUTPATIENT
Start: 2022-03-18 | End: 2022-03-19 | Stop reason: HOSPADM

## 2022-03-18 RX ORDER — AMOXICILLIN 250 MG
1-2 CAPSULE ORAL 2 TIMES DAILY
Status: DISCONTINUED | OUTPATIENT
Start: 2022-03-18 | End: 2022-03-19 | Stop reason: HOSPADM

## 2022-03-18 RX ORDER — ACETAMINOPHEN 325 MG/1
650 TABLET ORAL EVERY 4 HOURS PRN
Status: DISCONTINUED | OUTPATIENT
Start: 2022-03-18 | End: 2022-03-19 | Stop reason: HOSPADM

## 2022-03-18 RX ORDER — ONDANSETRON 4 MG/1
4 TABLET, ORALLY DISINTEGRATING ORAL EVERY 6 HOURS PRN
Status: DISCONTINUED | OUTPATIENT
Start: 2022-03-18 | End: 2022-03-19 | Stop reason: HOSPADM

## 2022-03-18 RX ORDER — LIDOCAINE 4 G/G
1 PATCH TOPICAL
Status: DISCONTINUED | OUTPATIENT
Start: 2022-03-18 | End: 2022-03-19 | Stop reason: HOSPADM

## 2022-03-18 RX ORDER — ONDANSETRON 2 MG/ML
4 INJECTION INTRAMUSCULAR; INTRAVENOUS EVERY 6 HOURS PRN
Status: DISCONTINUED | OUTPATIENT
Start: 2022-03-18 | End: 2022-03-19 | Stop reason: HOSPADM

## 2022-03-18 RX ORDER — POLYETHYLENE GLYCOL 3350 17 G/17G
17 POWDER, FOR SOLUTION ORAL DAILY PRN
Status: DISCONTINUED | OUTPATIENT
Start: 2022-03-18 | End: 2022-03-19 | Stop reason: HOSPADM

## 2022-03-18 RX ORDER — PIPERACILLIN SODIUM, TAZOBACTAM SODIUM 3; .375 G/15ML; G/15ML
3.38 INJECTION, POWDER, LYOPHILIZED, FOR SOLUTION INTRAVENOUS EVERY 6 HOURS
Status: DISCONTINUED | OUTPATIENT
Start: 2022-03-18 | End: 2022-03-19

## 2022-03-18 RX ADMIN — PIPERACILLIN AND TAZOBACTAM 3.38 G: 3; .375 INJECTION, POWDER, LYOPHILIZED, FOR SOLUTION INTRAVENOUS at 22:20

## 2022-03-18 RX ADMIN — POTASSIUM CHLORIDE, DEXTROSE MONOHYDRATE AND SODIUM CHLORIDE: 150; 5; 450 INJECTION, SOLUTION INTRAVENOUS at 14:04

## 2022-03-18 RX ADMIN — ACETAMINOPHEN 650 MG: 325 TABLET ORAL at 11:49

## 2022-03-18 RX ADMIN — PIPERACILLIN AND TAZOBACTAM 3.38 G: 3; .375 INJECTION, POWDER, LYOPHILIZED, FOR SOLUTION INTRAVENOUS at 04:38

## 2022-03-18 RX ADMIN — PIPERACILLIN AND TAZOBACTAM 3.38 G: 3; .375 INJECTION, POWDER, LYOPHILIZED, FOR SOLUTION INTRAVENOUS at 09:53

## 2022-03-18 RX ADMIN — POTASSIUM CHLORIDE, DEXTROSE MONOHYDRATE AND SODIUM CHLORIDE: 150; 5; 450 INJECTION, SOLUTION INTRAVENOUS at 04:35

## 2022-03-18 RX ADMIN — PIPERACILLIN AND TAZOBACTAM 3.38 G: 3; .375 INJECTION, POWDER, LYOPHILIZED, FOR SOLUTION INTRAVENOUS at 17:03

## 2022-03-18 ASSESSMENT — ACTIVITIES OF DAILY LIVING (ADL)
CHANGE_IN_FUNCTIONAL_STATUS_SINCE_ONSET_OF_CURRENT_ILLNESS/INJURY: NO
TOILETING_ISSUES: NO
ADLS_ACUITY_SCORE: 3
WALKING_OR_CLIMBING_STAIRS_DIFFICULTY: NO
ADLS_ACUITY_SCORE: 3
ADLS_ACUITY_SCORE: 7
CONCENTRATING,_REMEMBERING_OR_MAKING_DECISIONS_DIFFICULTY: NO
DRESSING/BATHING_DIFFICULTY: NO
ADLS_ACUITY_SCORE: 3
ADLS_ACUITY_SCORE: 3
WEAR_GLASSES_OR_BLIND: NO
ADLS_ACUITY_SCORE: 7
ADLS_ACUITY_SCORE: 3
DIFFICULTY_EATING/SWALLOWING: NO
ADLS_ACUITY_SCORE: 7
ADLS_ACUITY_SCORE: 7
ADLS_ACUITY_SCORE: 3
ADLS_ACUITY_SCORE: 3
DOING_ERRANDS_INDEPENDENTLY_DIFFICULTY: NO
ADLS_ACUITY_SCORE: 3

## 2022-03-18 ASSESSMENT — ENCOUNTER SYMPTOMS
FEVER: 0
NAUSEA: 1
ABDOMINAL PAIN: 1

## 2022-03-18 NOTE — CONSULTS
Attestation signed by Humberto Nichols MD at 3/18/2022  1:58 PM   Physician Attestation   I, Humberto Nichols MD, saw this patient with the resident and agree with the resident/fellow's findings and plan of care as documented in the note.       I personally reviewed vital signs, medications, labs, and imaging.     Key findings: perforated retrocecal appendicitis with phlegmon and ~3cm abscess formation. Non septic. Plan for bowel rest and zosyn. Antibiotic therapy and interval appendectomy in the future     Humberto Nichols MD  Date of Service (when I saw the patient): 03/18/22         [facility] Surgery Consultation    Erich Pierce MRN# [id number]   Age: 41 year old YOB: 1980     Date of Admission:  3/17/2022    Date of Consult:   3/17/22    Reason for consult: Appendicitis       Requesting service: ED; requesting provider: Dr. Reyes                   Assessment and Plan:   Assessment:   40 y/o male with PMH s/f for HLD, presents with 4 day history of abdominal pain. CT with perforated retrocecal appendicitis with a ~3 cm abscess. Afebrile, HD stable. Labs note WBC of 10.3 at OSH, Crt of 1.1. Abdomen is soft, ND, minimal tenderness over infraumbilical region, no guarding, no s/o peritonitis.         Plan:     - Admit to obs under general surgery  - NPO  - IVF, IV abx- Zosyn q6h  - COVID test  - CT- over read  - Repeat am labs  - Will get official IR consult in am to see if abscess if drainable.     Discussed with staff, Dr. Simone Smith, RAFAL, MS  Surgery, PGY-2            Chief Complaint:     Abdominal Pain       History of Present Illness:     2 y/o male with PMH s/f for HLD, presents with 4 day history of abdominal pain. Patient was travelling on vacation in [place]. Pain started on Monday, umbilical pain, initially 2/10- progressively increased to 7/10 on Wednesday, non radiating, associated with chills and possible fever (unmeasured on Wednesday). Denies nausea, vomiting, chest pain, SOB,  rhinorrhea, constipation, diarrhea. Some discomfort when urinating, but not exactly pain.             Past Medical History:     Past Medical History:   Diagnosis Date     Essential hypertriglyceridemia      Pain, joint, shoulder      Temporal arteritis (H)              Past Surgical History:     Past Surgical History:   Procedure Laterality Date     SKIN GRAFT       WISDOM TOOTH EXTRACTION               Social History:     Social History     Tobacco Use     Smoking status: Never Smoker     Smokeless tobacco: Never Used   Substance Use Topics     Alcohol use: Not on file   none          Family History:     Family History   Problem Relation Age of Onset     Leukemia Father          age 64 of acute myelogenous leukimia     Depression Mother      No Known Problems Sister      No Known Problems Daughter    none           Allergies:   No Known Allergies   None- just seasonal          Medications:     Current Facility-Administered Medications   Medication     acetaminophen (TYLENOL) tablet 650 mg     dextrose 5% and 0.45% NaCl + KCl 20 mEq/L infusion     Lidocaine (LIDOCARE) 4 % Patch 1 patch     lidocaine (LMX4) cream     lidocaine 1 % 0.1-1 mL     lidocaine patch in PLACE     ondansetron (ZOFRAN-ODT) ODT tab 4 mg    Or     ondansetron (ZOFRAN) injection 4 mg     oxyCODONE (ROXICODONE) tablet 5 mg     piperacillin-tazobactam (ZOSYN) 3.375 g vial to attach to  mL bag     polyethylene glycol (MIRALAX) Packet 17 g     senna-docusate (SENOKOT-S/PERICOLACE) 8.6-50 MG per tablet 1-2 tablet     sodium chloride (PF) 0.9% PF flush 3 mL     sodium chloride (PF) 0.9% PF flush 3 mL     Current Outpatient Medications   Medication Sig     cholecalciferol, vitamin D3, 125 mcg (5,000 unit) capsule [CHOLECALCIFEROL, VITAMIN D3, 125 MCG (5,000 UNIT) CAPSULE] Take 5,000 Units by mouth daily.     EPIPEN 2-JORGE 0.3 mg/0.3 mL atIn [EPIPEN 2-JORGE 0.3 MG/0.3 ML ATIN] Inject into thigh as directed     fenofibrate micronized  (LOFIBRA) 200 MG capsule [FENOFIBRATE MICRONIZED (LOFIBRA) 200 MG CAPSULE] Take 1 capsule (200 mg total) by mouth daily before breakfast.     loratadine (CLARITIN) 10 mg tablet [LORATADINE (CLARITIN) 10 MG TABLET] Take 10 mg by mouth daily.     rosuvastatin (CRESTOR) 10 MG tablet [ROSUVASTATIN (CRESTOR) 10 MG TABLET] Take 1 tablet (10 mg total) by mouth at bedtime.               Review of Systems:   CV: NEGATIVE for chest pain, palpitations or peripheral edema  C: NEGATIVE for  change in weight  E/M: NEGATIVE for ear, mouth and throat problems  R: NEGATIVE for significant cough or SOB  : positive dysuria  GI: positive abdominal pain  Neuro: negative  MSK: negative          Physical Exam:   All vitals have been reviewed  Temp:  [98.5  F (36.9  C)] 98.5  F (36.9  C)  Pulse:  [69-82] 80  Resp:  [16] 16  BP: (134-141)/(88-95) 141/95  SpO2:  [96 %-98 %] 98 %  No intake or output data in the 24 hours ending 03/18/22 0218  Physical Exam:  Gen: AOx3, no acue distress  HEENT: PERRL, EOMI  Pulm; ULB  CV: RRR  GI: soft, ND, minimal tenderness over infraumbilical region, no guarding, no s/o peritonitis.   Ext: WWP, no edema  Neuro: no focal deficits  : normal external genitalia  Skin: no rashes or lesions            Data:   All laboratory data reviewed    Results:  BMPNo lab results found in last 7 days.  CBCNo lab results found in last 7 days.  LFTNo lab results found in last 7 days.  No results for input(s): GLC, BGM in the last 168 hours.    Imaging:    No results found.   CT with perforated appendicitis with abscess. Awaiting official read.       Natalio Smith MD

## 2022-03-18 NOTE — CARE PLAN
2 pants, two t-shirts, shorts(1), winter hat, a black jacket, socks and pair of shoes.  All these Items are in a brown bag in the closet. A wallet, ( $100 cash) 2 phone chargers and I phone, [employer] laptop/.  Pt turned down the offer to send  Wallet to security

## 2022-03-18 NOTE — PLAN OF CARE
Vital signs stable on RA. Up independent in room. Reports mild pain 2/10 to abdomen. Prn tylenol given for head ache. NPO. Continue IV antibiotics and switch to oral tomorrow. Possible discharge home tomorrow. Will continue to monitor.     Goal Outcome Evaluation:    Plan of Care Reviewed With: patient     Overall Patient Progress: no change    Outcome Evaluation: NPO, monitor abdominal pain, antibiotics.

## 2022-03-18 NOTE — PROGRESS NOTES
Brief Update    Will admit patient and proceed with 10 day course of antibiotic treatment.  Plan for outpatient follow-up with interval appendectomy once course improved.     Plan   -Admit to EGS   -Continue IV piperacillin-tazobactam (plan to switch to oral abx)   -Keep NPO   -Continue IVF    -Up ad iker   -Ppx enoxaparin    Ady Blackwell MD, NONI  PGY1 General Surgery  [phone]

## 2022-03-18 NOTE — ED TRIAGE NOTES
Patient arrives ambulatory to triage with significant other from urgent care  Pt had ct done, appendicitis, possible rupture. Patient had bad pain for two to three days, then pain went away.   VSS

## 2022-03-18 NOTE — SUMMARY OF CARE
Reason for admission: acute appendicitis with possible rupture  Admitted from: [facility]   2 RN skin assessment completed by: yMra RN and Yodit RN   Bed Algorithm reevaluated: yes  Was Pulsate ordered?: no, not needed  Care plan (primary problem) and Education initiated:  yes  Flu shot ordered (October-April only): no, pt declined  Detailed Belongings: See summary of care note        RN Decompensation Assessment (Repeat at 12 hours)    (Additional guidance for RRT)      Mentation:  Exam is notable for: Alert and oriented x4    Respiratory mechanics and oxygenation:  Exam is notable for: WNL on RA    Cardiovascular/perfusion:   Exam is notable for: WNL    Overall estimation of the patient s condition/stability (1 = stable patient, 7 = appears very sick)? 1

## 2022-03-18 NOTE — ED PROVIDER NOTES
ED Provider Note  [facility]      History     Chief Complaint   Patient presents with     Abdominal Pain     HPI  Erich Pierce is a 41 year old male who has no reported previous past medical history who presents emergency department today as a transfer from outside facility with concerns for acute appendicitis.  His symptoms started approximately 3 days ago with some periumbilical abdominal pain, that initially radiated more to the left side of his umbilicus and then to the right.  Associated with pain after eating and some nausea but no vomiting.  Noticed the pain worsening after both urination and defecation.  Was on vacation so did not seek care until he returned today.  Labs grossly unremarkable at outside facility but imaging with reported retrocecal appendicitis with associated abscess.  Given IV Zosyn at outside facility prior to transfer.  He is afebrile and well-appearing, reports some abdominal pain, but is overall improved from when he presented to outside facility.  He denies other physical complaints.    This problem is acute, sudden onset, moderate in intensity, constant, waxing and waning.  I reviewed his past medical, past social, and past surgical history, the relevant details of which are listed in the above HPI    Past Medical History  Past Medical History:   Diagnosis Date     Essential hypertriglyceridemia      Pain, joint, shoulder      Temporal arteritis (H)      Past Surgical History:   Procedure Laterality Date     SKIN GRAFT       WISDOM TOOTH EXTRACTION       cholecalciferol, vitamin D3, 125 mcg (5,000 unit) capsule  EPIPEN 2-JORGE 0.3 mg/0.3 mL atIn  fenofibrate micronized (LOFIBRA) 200 MG capsule  loratadine (CLARITIN) 10 mg tablet  rosuvastatin (CRESTOR) 10 MG tablet      No Known Allergies  Family History  Family History   Problem Relation Age of Onset     Leukemia Father          age 64 of acute myelogenous leukimia     Depression Mother       "No Known Problems Sister      No Known Problems Daughter      Social History   Social History     Tobacco Use     Smoking status: Never Smoker     Smokeless tobacco: Never Used   Substance Use Topics     Alcohol use: Not on file     Drug use: Not on file      Past medical history, past surgical history, medications, allergies, family history, and social history were reviewed with the patient. No additional pertinent items.       Review of Systems   Constitutional: Negative for fever.   Gastrointestinal: Positive for abdominal pain and nausea.   All other systems reviewed and are negative.    A complete review of systems was performed with pertinent positives and negatives noted in the HPI, and all other systems negative.    Physical Exam   BP: (!) 134/95  Pulse: 82  Temp: 98.5  F (36.9  C)  Resp: 16  Height: 170.2 cm (5' 7\")  Weight: 81.6 kg (180 lb)  SpO2: 97 %  Physical Exam  Vitals and nursing note reviewed.   Constitutional:       Appearance: He is not toxic-appearing.   HENT:      Head: Normocephalic and atraumatic.      Right Ear: External ear normal.      Left Ear: External ear normal.      Nose: Nose normal.   Eyes:      Extraocular Movements: Extraocular movements intact.      Conjunctiva/sclera: Conjunctivae normal.   Cardiovascular:      Rate and Rhythm: Normal rate and regular rhythm.   Pulmonary:      Effort: Pulmonary effort is normal.      Breath sounds: Normal breath sounds.   Abdominal:      General: There is no distension.      Palpations: Abdomen is soft.      Tenderness: There is abdominal tenderness.      Comments: Periumbilical and suprapubic tenderness to palpation, no right lower quadrant or left lower quadrant tenderness to palpation, no guarding or rebound.   Musculoskeletal:         General: No deformity or signs of injury.      Cervical back: Neck supple. No rigidity.   Skin:     General: Skin is warm.      Findings: No rash.   Neurological:      General: No focal deficit present.      " Mental Status: He is alert. Mental status is at baseline.   Psychiatric:         Mood and Affect: Mood normal.         Behavior: Behavior normal.       ED Course      Procedures       The medical record was reviewed and interpreted.  Previous labs reviewed and interpreted.  Previous images reviewed and interpreted: Retrocecal appendicitis.              Results for orders placed or performed during the hospital encounter of 03/17/22   Potassium     Status: Normal   Result Value Ref Range    Potassium 3.6 3.4 - 5.3 mmol/L   Magnesium     Status: Normal   Result Value Ref Range    Magnesium 2.0 1.6 - 2.3 mg/dL   Extra Tube *Canceled*     Status: None ()    Narrative    The following orders were created for panel order Extra Tube.  Procedure                               Abnormality         Status                     ---------                               -----------         ------                     Extra Green Top (Lithium...[[id number]]                                                   Please view results for these tests on the individual orders.     Medications   lidocaine 1 % 0.1-1 mL (has no administration in time range)   lidocaine (LMX4) cream (has no administration in time range)   sodium chloride (PF) 0.9% PF flush 3 mL (3 mLs Intracatheter Not Given 3/18/22 0311)   sodium chloride (PF) 0.9% PF flush 3 mL (has no administration in time range)   dextrose 5% and 0.45% NaCl + KCl 20 mEq/L infusion (has no administration in time range)   acetaminophen (TYLENOL) tablet 650 mg (has no administration in time range)   Lidocaine (LIDOCARE) 4 % Patch 1 patch (has no administration in time range)   lidocaine patch in PLACE (has no administration in time range)   oxyCODONE (ROXICODONE) tablet 5 mg (has no administration in time range)   senna-docusate (SENOKOT-S/PERICOLACE) 8.6-50 MG per tablet 1-2 tablet (has no administration in time range)   polyethylene glycol (MIRALAX) Packet 17 g (has no administration in time  range)   ondansetron (ZOFRAN-ODT) ODT tab 4 mg (has no administration in time range)     Or   ondansetron (ZOFRAN) injection 4 mg (has no administration in time range)   piperacillin-tazobactam (ZOSYN) 3.375 g vial to attach to  mL bag (has no administration in time range)        Assessments & Plan (with Medical Decision Making)   MDM:    41 year old M who presents to the emergency department today as transfer from outside facility for retrocecal appendicitis with associated abscess.  Given IV antibiotics prior to presentation, made n.p.o. at this time.  General surgery consulted, they will admit to their service for further care.    On reevaluation he is clinically unchanged.  I discussed all test results and the plan of care with the patient and his wife, who are agreeable for admission.  Admitted to general surgery.    I have reviewed the nursing notes. I have reviewed the findings, diagnosis, plan and need for follow up with the patient.    New Prescriptions    No medications on file       Final diagnoses:   Acute abdominal pain   Acute appendicitis, unspecified acute appendicitis type       --  Missy Reyes MD  [facility] EMERGENCY DEPARTMENT  3/17/2022

## 2022-03-19 VITALS
TEMPERATURE: 97.5 F | OXYGEN SATURATION: 97 % | DIASTOLIC BLOOD PRESSURE: 87 MMHG | WEIGHT: 185.19 LBS | HEART RATE: 71 BPM | SYSTOLIC BLOOD PRESSURE: 135 MMHG | HEIGHT: 67 IN | RESPIRATION RATE: 20 BRPM | BODY MASS INDEX: 29.07 KG/M2

## 2022-03-19 LAB
ALBUMIN SERPL-MCNC: 3.2 G/DL (ref 3.4–5)
ALP SERPL-CCNC: 38 U/L (ref 40–150)
ALT SERPL W P-5'-P-CCNC: 36 U/L (ref 0–70)
ANION GAP SERPL CALCULATED.3IONS-SCNC: 8 MMOL/L (ref 3–14)
AST SERPL W P-5'-P-CCNC: 18 U/L (ref 0–45)
BILIRUB SERPL-MCNC: 0.7 MG/DL (ref 0.2–1.3)
BUN SERPL-MCNC: 13 MG/DL (ref 7–30)
CALCIUM SERPL-MCNC: 8.6 MG/DL (ref 8.5–10.1)
CHLORIDE BLD-SCNC: 107 MMOL/L (ref 94–109)
CO2 SERPL-SCNC: 24 MMOL/L (ref 20–32)
CREAT SERPL-MCNC: 1.27 MG/DL (ref 0.66–1.25)
ERYTHROCYTE [DISTWIDTH] IN BLOOD BY AUTOMATED COUNT: 11.3 % (ref 10–15)
GFR SERPL CREATININE-BSD FRML MDRD: 72 ML/MIN/1.73M2
GLUCOSE BLD-MCNC: 124 MG/DL (ref 70–99)
HCT VFR BLD AUTO: 41.5 % (ref 40–53)
HGB BLD-MCNC: 13.6 G/DL (ref 13.3–17.7)
MCH RBC QN AUTO: 28.4 PG (ref 26.5–33)
MCHC RBC AUTO-ENTMCNC: 32.8 G/DL (ref 31.5–36.5)
MCV RBC AUTO: 87 FL (ref 78–100)
PLATELET # BLD AUTO: 244 10E3/UL (ref 150–450)
POTASSIUM BLD-SCNC: 3.7 MMOL/L (ref 3.4–5.3)
PROT SERPL-MCNC: 6.9 G/DL (ref 6.8–8.8)
RBC # BLD AUTO: 4.79 10E6/UL (ref 4.4–5.9)
SODIUM SERPL-SCNC: 139 MMOL/L (ref 133–144)
WBC # BLD AUTO: 7.6 10E3/UL (ref 4–11)

## 2022-03-19 PROCEDURE — 80053 COMPREHEN METABOLIC PANEL: CPT | Performed by: STUDENT IN AN ORGANIZED HEALTH CARE EDUCATION/TRAINING PROGRAM

## 2022-03-19 PROCEDURE — 250N000011 HC RX IP 250 OP 636

## 2022-03-19 PROCEDURE — 258N000003 HC RX IP 258 OP 636: Performed by: STUDENT IN AN ORGANIZED HEALTH CARE EDUCATION/TRAINING PROGRAM

## 2022-03-19 PROCEDURE — 36415 COLL VENOUS BLD VENIPUNCTURE: CPT | Performed by: STUDENT IN AN ORGANIZED HEALTH CARE EDUCATION/TRAINING PROGRAM

## 2022-03-19 PROCEDURE — 85027 COMPLETE CBC AUTOMATED: CPT | Performed by: STUDENT IN AN ORGANIZED HEALTH CARE EDUCATION/TRAINING PROGRAM

## 2022-03-19 PROCEDURE — 250N000013 HC RX MED GY IP 250 OP 250 PS 637: Performed by: SURGERY

## 2022-03-19 PROCEDURE — 82040 ASSAY OF SERUM ALBUMIN: CPT | Performed by: STUDENT IN AN ORGANIZED HEALTH CARE EDUCATION/TRAINING PROGRAM

## 2022-03-19 PROCEDURE — 250N000011 HC RX IP 250 OP 636: Performed by: STUDENT IN AN ORGANIZED HEALTH CARE EDUCATION/TRAINING PROGRAM

## 2022-03-19 RX ORDER — ACETAMINOPHEN 325 MG/1
1000 TABLET ORAL 3 TIMES DAILY
Qty: 126 TABLET | Refills: 0 | Status: SHIPPED | OUTPATIENT
Start: 2022-03-19 | End: 2022-04-02

## 2022-03-19 RX ORDER — PIPERACILLIN SODIUM, TAZOBACTAM SODIUM 3; .375 G/15ML; G/15ML
3.38 INJECTION, POWDER, LYOPHILIZED, FOR SOLUTION INTRAVENOUS EVERY 6 HOURS
Status: COMPLETED | OUTPATIENT
Start: 2022-03-19 | End: 2022-03-19

## 2022-03-19 RX ORDER — METRONIDAZOLE 500 MG/1
500 TABLET ORAL 3 TIMES DAILY
Qty: 26 TABLET | Refills: 0 | Status: SHIPPED | OUTPATIENT
Start: 2022-03-19 | End: 2022-03-28

## 2022-03-19 RX ORDER — METRONIDAZOLE 500 MG/1
500 TABLET ORAL 3 TIMES DAILY
Status: DISCONTINUED | OUTPATIENT
Start: 2022-03-19 | End: 2022-03-19

## 2022-03-19 RX ORDER — CIPROFLOXACIN 250 MG/1
500 TABLET, FILM COATED ORAL EVERY 12 HOURS SCHEDULED
Status: DISCONTINUED | OUTPATIENT
Start: 2022-03-19 | End: 2022-03-19 | Stop reason: HOSPADM

## 2022-03-19 RX ORDER — CIPROFLOXACIN 500 MG/1
500 TABLET, FILM COATED ORAL EVERY 12 HOURS
Qty: 17 TABLET | Refills: 0 | Status: SHIPPED | OUTPATIENT
Start: 2022-03-19 | End: 2022-03-28

## 2022-03-19 RX ORDER — METRONIDAZOLE 500 MG/1
500 TABLET ORAL 3 TIMES DAILY
Status: DISCONTINUED | OUTPATIENT
Start: 2022-03-19 | End: 2022-03-19 | Stop reason: HOSPADM

## 2022-03-19 RX ORDER — CIPROFLOXACIN 250 MG/1
500 TABLET, FILM COATED ORAL EVERY 12 HOURS SCHEDULED
Status: DISCONTINUED | OUTPATIENT
Start: 2022-03-19 | End: 2022-03-19

## 2022-03-19 RX ADMIN — PIPERACILLIN AND TAZOBACTAM 3.38 G: 3; .375 INJECTION, POWDER, LYOPHILIZED, FOR SOLUTION INTRAVENOUS at 10:41

## 2022-03-19 RX ADMIN — PIPERACILLIN AND TAZOBACTAM 3.38 G: 3; .375 INJECTION, POWDER, LYOPHILIZED, FOR SOLUTION INTRAVENOUS at 04:16

## 2022-03-19 RX ADMIN — METRONIDAZOLE 500 MG: 500 TABLET ORAL at 11:32

## 2022-03-19 RX ADMIN — CIPROFLOXACIN HYDROCHLORIDE 500 MG: 250 TABLET, FILM COATED ORAL at 11:32

## 2022-03-19 RX ADMIN — POTASSIUM CHLORIDE, DEXTROSE MONOHYDRATE AND SODIUM CHLORIDE: 150; 5; 450 INJECTION, SOLUTION INTRAVENOUS at 00:29

## 2022-03-19 ASSESSMENT — ACTIVITIES OF DAILY LIVING (ADL)
ADLS_ACUITY_SCORE: 3

## 2022-03-19 NOTE — PLAN OF CARE
"Goal Outcome Evaluation:    Plan of Care Reviewed With: patient     Overall Patient Progress: no change    Outcome Evaluation: NPO, no change in abdominal pain, resting, tolerating antibx, VSS    A+Ox4. Up independently. No change in reported symptoms. /88 (BP Location: Left arm)   Pulse 79   Temp 97.6  F (36.4  C) (Oral)   Resp 17   Ht 1.702 m (5' 7\")   Wt 83.4 kg (183 lb 13.8 oz)   SpO2 98%   BMI 28.80 kg/m       Plan: Pt to remain NPO until tomorrow while monitoring pain and VS. Plan for switch to oral antibx and possible discharge if no decline. RN to contact surgery if decline noted and need for surgery apparent.   "

## 2022-03-19 NOTE — PLAN OF CARE
"Goal Outcome Evaluation:/78 (BP Location: Left arm)   Pulse 58   Temp 97.7  F (36.5  C) (Oral)   Resp 20   Ht 1.702 m (5' 7\")   Wt 84 kg (185 lb 3 oz)   SpO2 99%   BMI 29.00 kg/m   RA. Up independent in room. Reports mild pain 2-3/ 10 to abdomen. Refused anything for pain. NPO. Continue IV Zosyn  and switch to oral today .Possible discharge home today if everything is going well. Will continue to monitor. Up voiding in bathroom.Denies nausea,IV at 125 hr.  Goal Outcome Evaluation  Plan of Care Reviewed With: patient to stay on top of pain.     Overall Patient Progress: no change     Outcome Evaluation: NPO, monitor abdominal pain, antibiotics.                      "

## 2022-03-19 NOTE — PROGRESS NOTES
"  Emergency General Surgery Progress Note  03/19/2022     S: No acute events overnight. Pain moderately controlled. NPO with IVF. Desires regular food, no nausea or emesis. Adequate UOP.     O:  Vitals:   /86 (BP Location: Left arm)   Pulse 68   Temp 98.1  F (36.7  C) (Oral)   Resp 20   Ht 1.702 m (5' 7\")   Wt 84 kg (185 lb 3 oz)   SpO2 98%   BMI 29.00 kg/m      Physical Exam   Abd soft, non-tender to palpation, non-distended  Ext warm well perfused    I/O last 3 completed shifts:  In: 1720.83 [I.V.:1620.83; IV Piggyback:100]  Out: -     Labs:  WBC 7.6, Hgb 13.6  ALT 36, AST 18    A/P: 42 y/o male with PMH s/f for HLD, presents with 5-day history of abdominal pain. CT with perforated retrocecal appendicitis with a ~3 cm abscess, improving with antibiotics    - Start reg diet  - Switch from IV to PO antibiotics, plan for 10 day course of antibiotics - Cipro + Flagyl  - Discharge to home later this AM if tolerating reg diet  - Follow up clinic visit    Seen and discussed with chief Dr. Eli who will d/w staff    Damion Paris MD  Surgery, PGY-2  [facility]          "

## 2022-03-19 NOTE — PLAN OF CARE
Discharged to:  home  Transportation: friend ride  Time: 1200  Prescriptions: cipro and flagyl  Belongings: returned to patient  PIV/Access: removed  Care Plan and Education discontinued: yes  Paperwork: given to patient and questions answered    Pt reminded that cipro should be administered at least 2 hours before or 4 hours after aluminum, calcium, iron, zinc or magnesium containing medications. May be taken with food or on an empty stomach. Do not administer alone with a dairy product like milk or yogurt or calcium-fortified juice, but may be administered with a meal containing dairy.

## 2022-03-19 NOTE — DISCHARGE SUMMARY
[facility]  Discharge Summary  General Surgery     Erich Pierce MRN# [id number]   YOB: 1980 Age: 41 year old     Date of Admission:  3/17/2022  Date of Discharge::  3/19/2022  Admitting Physician:  Humberto Nichols MD  Discharge Physician:  Gerardo Sidhu MD  Primary Care Physician:        Damaso Harris          Admission Diagnoses:   Acute abdominal pain [R10.9]  Acute appendicitis, unspecified acute appendicitis type [K35.80]          Discharge Diagnosis:   No discharge information exists for this patient.          Procedures:   None          Consultations:   None          Brief History of Illness:    42 y/o male with PMH s/f HLD, presents with 4 day history of abdominal pain. Patient was travelling on vacation in [place]. Pain started on Monday, umbilical pain, initially 2/10- progressively increased to 7/10 on Wednesday, non radiating, associated with chills and possible fever (unmeasured on Wednesday). Denies nausea, vomiting, chest pain, SOB, rhinorrhea, constipation, diarrhea. Some discomfort when urinating, but not exactly pain.     In ED, CT with perforated retrocecal appendicitis with a ~3 cm abscess. Afebrile, HD stable. Labs note WBC of 10.3 at OSH, Crt of 1.1. Abdomen is soft, ND, minimal tenderness over infraumbilical region, no guarding, no s/o peritonitis.        Hospital Course:   Patient was admitted for ruptured appendicitis. Surgery was not recommended given the abscess formation and local inflammatory changes. Abscess draining was deferred due to the retrocecal positioning of the appendix, which increases the risk of damage to other surrounding organs. Patient was treated with IV antibiotics for ruptured appendicitis and plan for completion of 10 day course of oral antibiotic. Patient with follow-up with outpatient general surgery clinic in 6-8 weeks to discuss interval appendectomy.         Imaging Studies:     Results for orders placed or performed in visit on  11/28/16   XR Hand Left 2 Views    Narrative    XR HAND LEFT 2 VWS11/28/2016 4:28 PMINDICATION: Pain in joints of left handCOMPARISON: None.FINDINGS: Negative hand. Joint spaces are well-maintained. No fracture or dislocation.This report was electronically interpreted by: Dr. Elia Reese MD ON   11/28/2016 at 16:58              Medications Prior to Admission:     Medications Prior to Admission   Medication Sig Dispense Refill Last Dose     cholecalciferol, vitamin D3, 125 mcg (5,000 unit) capsule [CHOLECALCIFEROL, VITAMIN D3, 125 MCG (5,000 UNIT) CAPSULE] Take 5,000 Units by mouth daily.   3/17/2022 at Unknown time     fenofibrate micronized (LOFIBRA) 200 MG capsule [FENOFIBRATE MICRONIZED (LOFIBRA) 200 MG CAPSULE] Take 1 capsule (200 mg total) by mouth daily before breakfast. 90 capsule 3 3/17/2022 at Unknown time     rosuvastatin (CRESTOR) 10 MG tablet [ROSUVASTATIN (CRESTOR) 10 MG TABLET] Take 1 tablet (10 mg total) by mouth at bedtime. 90 tablet 3 3/17/2022 at Unknown time     EPIPEN 2-JORGE 0.3 mg/0.3 mL atIn [EPIPEN 2-JORGE 0.3 MG/0.3 ML ATIN] Inject into thigh as directed 1 1      loratadine (CLARITIN) 10 mg tablet [LORATADINE (CLARITIN) 10 MG TABLET] Take 10 mg by mouth daily.                 Discharge Medications:     Current Discharge Medication List      CONTINUE these medications which have NOT CHANGED    Details   cholecalciferol, vitamin D3, 125 mcg (5,000 unit) capsule [CHOLECALCIFEROL, VITAMIN D3, 125 MCG (5,000 UNIT) CAPSULE] Take 5,000 Units by mouth daily.      fenofibrate micronized (LOFIBRA) 200 MG capsule [FENOFIBRATE MICRONIZED (LOFIBRA) 200 MG CAPSULE] Take 1 capsule (200 mg total) by mouth daily before breakfast.  Qty: 90 capsule, Refills: 3    Associated Diagnoses: Mixed hyperlipidemia      rosuvastatin (CRESTOR) 10 MG tablet [ROSUVASTATIN (CRESTOR) 10 MG TABLET] Take 1 tablet (10 mg total) by mouth at bedtime.  Qty: 90 tablet, Refills: 3    Associated Diagnoses: Mixed hyperlipidemia       EPIPEN 2-JORGE 0.3 mg/0.3 mL atIn [EPIPEN 2-JORGE 0.3 MG/0.3 ML ATIN] Inject into thigh as directed  Qty: 1, Refills: 1    Comments: One 2-jorge  Associated Diagnoses: Food allergy      loratadine (CLARITIN) 10 mg tablet [LORATADINE (CLARITIN) 10 MG TABLET] Take 10 mg by mouth daily.                     Medications Discontinued or Adjusted During This Hospitalization:   No change           Antibiotics Prescribed at Discharge:   Ciprofloxacin 500mg tabs, every 12 hrs, Duration: 10 days  Metronidazole 500mg tabs every 8 hrs, Duration: 10 days         Day of Discharge Physical Exam:   Temp:  [97.6  F (36.4  C)-98.8  F (37.1  C)] 97.7  F (36.5  C)  Pulse:  [58-83] 58  Resp:  [17-24] 20  BP: (129-137)/(78-88) 137/78  SpO2:  [95 %-99 %] 99 %    General: awake, alert, no acute distress, resting comfortably in bed   CV: warm, well perfused   Pulm: breathing comfortably on room air   Abdomen: soft, non-distended, mild tenderness inferior to the umbilicus, no rebound or guarding   Extremities: no edema, moving all extremities spontaneously and without apparent deficit            Final Pathology Result:   None         Discharge Instructions and Follow-Up:   No discharge procedures on file.           Home Health Care:     Not needed           Discharge Disposition:     Discharged to home      Condition at discharge: Good    Patient was seen, examined, and discussed on day of discharge with chief resident Dr. Edd Eli, who discussed with staff surgeon Dr. Gerardo Blackwell MD, NONI  PGY1 General Surgery  [phone]

## 2022-03-19 NOTE — PLAN OF CARE
"[facility] Nursing Summary of Care    Patient Name: Erich Pierce MRN# [id number]   Age: 41 year old YOB: 1980   Date of Admission: 3/17/2022    Isolation Precautions: No active isolations    Care delivered on March 19, 2022 from 19:00 to 23:30      No acute changes this evening. NPO status maintained with potassium chloride 20 mEq/L dextrose 5% and sodium chloride 0.45% running at rate of 125 mL/hr--compatible with IV piperacillin-tazobactam. Patient expected to switch to PO antibiotics tomorrow. Nursing staff to continue to monitor for acute changes related to perforated retrocecal appendicitis with abscess. Discharge likely tomorrow.       Vitals: VSS  o /84 (BP Location: Left arm)   Pulse 83   Temp 98.8  F (37.1  C) (Oral)   Resp 24   Ht 1.702 m (5' 7\")   Wt 84 kg (185 lb 3 oz)   SpO2 95%   BMI 29.00 kg/m      Pain: Patient reporting mild abdominal pain (1/10 via DVPRS).   o Intervention(s): Patient refused offered interventions.     Lines/Tubes/Drains:   Peripheral IV 03/18/22 Anterior;Right (Active)   Site Assessment WDL 03/18/22 2200   Line Status Infusing 03/18/22 2200   Phlebitis Scale 0-->no symptoms 03/18/22 1800   Infiltration Scale 0 03/18/22 1800   Number of days: 1       RN-Managed Electrolyte Labs  Potassium   Date Value Ref Range Status   03/18/2022 3.6 3.4 - 5.3 mmol/L Final     Magnesium   Date Value Ref Range Status   03/18/2022 2.0 1.6 - 2.3 mg/dL Final       Current Facility-Administered Medications   Medication     acetaminophen (TYLENOL) tablet 650 mg     dextrose 5% and 0.45% NaCl + KCl 20 mEq/L infusion     Lidocaine (LIDOCARE) 4 % Patch 1 patch     lidocaine (LMX4) cream     lidocaine 1 % 0.1-1 mL     lidocaine patch in PLACE     ondansetron (ZOFRAN-ODT) ODT tab 4 mg    Or     ondansetron (ZOFRAN) injection 4 mg     oxyCODONE (ROXICODONE) tablet 5 mg     piperacillin-tazobactam (ZOSYN) 3.375 g vial to attach to  mL bag     polyethylene glycol (MIRALAX) " Packet 17 g     senna-docusate (SENOKOT-S/PERICOLACE) 8.6-50 MG per tablet 1-2 tablet     sodium chloride (PF) 0.9% PF flush 3 mL     sodium chloride (PF) 0.9% PF flush 3 mL     Past Medical History:   Diagnosis Date     Essential hypertriglyceridemia      Pain, joint, shoulder      Temporal arteritis (H)      Past Surgical History:   Procedure Laterality Date     SKIN GRAFT  1990     WISDOM TOOTH EXTRACTION  2006     Medications Prior to Admission   Medication Sig Dispense Refill Last Dose     cholecalciferol, vitamin D3, 125 mcg (5,000 unit) capsule [CHOLECALCIFEROL, VITAMIN D3, 125 MCG (5,000 UNIT) CAPSULE] Take 5,000 Units by mouth daily.   3/17/2022 at Unknown time     fenofibrate micronized (LOFIBRA) 200 MG capsule [FENOFIBRATE MICRONIZED (LOFIBRA) 200 MG CAPSULE] Take 1 capsule (200 mg total) by mouth daily before breakfast. 90 capsule 3 3/17/2022 at Unknown time     rosuvastatin (CRESTOR) 10 MG tablet [ROSUVASTATIN (CRESTOR) 10 MG TABLET] Take 1 tablet (10 mg total) by mouth at bedtime. 90 tablet 3 3/17/2022 at Unknown time     EPIPEN 2-JORGE 0.3 mg/0.3 mL atIn [EPIPEN 2-JORGE 0.3 MG/0.3 ML ATIN] Inject into thigh as directed 1 1      loratadine (CLARITIN) 10 mg tablet [LORATADINE (CLARITIN) 10 MG TABLET] Take 10 mg by mouth daily.        Unresulted Labs Ordered in the Past 30 Days of this Admission       No orders found from 2/15/2022 to 3/18/2022.            Jose Yung RN  5B Adult General Medicine

## 2022-03-21 ENCOUNTER — PATIENT OUTREACH (OUTPATIENT)
Dept: SURGERY | Facility: CLINIC | Age: 42
End: 2022-03-21
Payer: COMMERCIAL

## 2022-03-21 NOTE — TELEPHONE ENCOUNTER
REFERRAL INFORMATION:    Referring Provider:  Dr. Ady Blackwell     Referring Clinic:  [facility]     Reason for Visit/Diagnosis: Hospital follow up, Appy        FUTURE VISIT INFORMATION:    Appointment Date: 3/23/2022    Appointment Time: 2 PM      NOTES RECORD STATUS  DETAILS   OFFICE NOTE from Referring Provider Internal 3/17/2022 ED- Admission    OFFICE NOTE from Other Specialists N/A    HOSPITAL DISCHARGE SUMMARY/ ED VISITS  Care Everywhere 3/17/2022 ([facility])    OPERATIVE REPORT N/A    ENDOSCOPY (EGD)  N/A    PERTINENT LABS Internal     PATHOLOGY REPORTS (RELATED) N/A    IMAGING (CT, MRI, US, XR)  Care Everywhere [facility]:  - CT Abdomen Pelvis: 3/17/2022     3/21/2022 8:13am Fax request sent to [facility] ([phone]) for CT image. -Vivi

## 2022-03-21 NOTE — PROGRESS NOTES
RN Post-Op/Post-Discharge Care Coordination Note    Spoke with Patient.    Support  Patient able to care for self independently     Health Status  Nausea/Vomiting: Patient reports feeling nauseated/unsettled. May be related to abx.  Eating/drinking: Patient is able to eat and drink without any complaints.  Bowel habits: Patient reports having loose stool- reports did not eat for 48 hours.  Drains (ALVARO): N/A  Fevers/chills: Patient denies any fever or chills.  Incisions: No surgery.    Pain: Minimal to none.  New Medications:  Cipro, Flagyl    Activity/Restrictions  No restrictions    Equipment  None    Pathology reviewed with patient:  N/A    Forms/Letters  No    All of his questions were answered.  He will call this office if he has any further questions and/or concerns.      Post hospital appointment 3/24 at 1500 with Dr. Sidhu.    Whom and When to Call  Patient acknowledges understanding of how to manage any medication changes and   when to seek medical care.     Patient advised that if after hour medical concerns arise to please call [phone] and choose option 4 to speak to the physician on call.

## 2022-03-21 NOTE — PROGRESS NOTES
Erich Pierce is a patient of the General Surgery Team that was hospitalized with a ruptured appendix which was conservatively managed.  Interval appendectomy will be planned in 6-8 weeks.  He was recently discharged from the hospital.  Attempted to contact patient via telephone for a status update and review post discharge  teaching.  LM on VM to call office.  Await return call.      Of note:  Wound:  No surgery  Follow-up:  Scheduled 3/24 and needs to be delayed by 7-10 days  Restrictions:  - No activity restrictions  New medications:  Cipro, Flagyl, Tylenol  Equipment/Supplies:  None

## 2022-03-23 ENCOUNTER — PRE VISIT (OUTPATIENT)
Dept: SURGERY | Facility: CLINIC | Age: 42
End: 2022-03-23

## 2022-03-24 ENCOUNTER — OFFICE VISIT (OUTPATIENT)
Dept: SURGERY | Facility: CLINIC | Age: 42
End: 2022-03-24
Payer: COMMERCIAL

## 2022-03-24 VITALS
DIASTOLIC BLOOD PRESSURE: 88 MMHG | HEIGHT: 67 IN | BODY MASS INDEX: 28.61 KG/M2 | OXYGEN SATURATION: 99 % | WEIGHT: 182.3 LBS | HEART RATE: 82 BPM | SYSTOLIC BLOOD PRESSURE: 128 MMHG

## 2022-03-24 DIAGNOSIS — K36 GRUMBLING APPENDIX: Primary | ICD-10-CM

## 2022-03-24 DIAGNOSIS — K35.80 ACUTE APPENDICITIS, UNSPECIFIED ACUTE APPENDICITIS TYPE: ICD-10-CM

## 2022-03-24 PROCEDURE — 99203 OFFICE O/P NEW LOW 30 MIN: CPT | Performed by: SURGERY

## 2022-03-24 RX ORDER — CEFAZOLIN SODIUM 2 G/50ML
2 SOLUTION INTRAVENOUS
Status: CANCELLED | OUTPATIENT
Start: 2022-03-24

## 2022-03-24 RX ORDER — CEFAZOLIN SODIUM 2 G/50ML
2 SOLUTION INTRAVENOUS SEE ADMIN INSTRUCTIONS
Status: CANCELLED | OUTPATIENT
Start: 2022-03-24

## 2022-03-24 ASSESSMENT — PAIN SCALES - GENERAL: PAINLEVEL: NO PAIN (0)

## 2022-03-24 NOTE — PROGRESS NOTES
Pre and Post op Patient Education/Teaching Flowsheet  Relevant Diagnosis:  Appendectomy  Teaching Topic:  Pre and post op teaching  Person(s) Involved in teaching:  Patient     Motivation Level:  Asks Questions:  Yes  Eager to Learn:  Yes  Cooperative:  Yes  Receptive (willing/able to accept information):  Yes  Any cultural factors/Mormon beliefs that may influence understanding or compliance?  No    Patient/caregiver/family demonstrates understanding of the following:  Reason for the appointment, diagnosis, and treatment plan:  Yes  Patient demonstrates understanding of the following:  Pre-op bowel prep:  No  Post-op pain management recommendations (medications, ice compress, binder/athletic supporter (if applicable), etc.:  Yes  Inguinal hernia patients:  Post-op urinary retention- discussed signs/symptoms and visit to ER for Croft catheter placement and to stay in place for at least 48 hours:  NA  Restrictions:  Yes  Medications to take the day of surgery:  Per PCP  Blood thinner medications discussed and when to stop (if applicable):  Yes  Wound care:  Yes  Diabetes medication management (if applicable):  Per PCP  Which situations necessitate calling provider and whom to contact:  Discussed how to contact the hospital, nurse, and clinic scheduling staff if necessary      Date and time of surgery:  TBD  Location of surgery: [facility] [place]- 5th Floor  History and Physical and any other testing necessary prior to surgery:  Yes  Required time line for completion of History and Physical and any pre-op testing:  Yes  Discuss need for someone to drive patient home and stay with them for 24 hours:  Yes  Pre-op showering/scrub information with Surgical Scrub:  Yes  NPO Guidelines:  NPO per Anesthesia Guidelines  COVID-19 Testing:  Yes    Infection Prevention: Patient demonstrates understanding of the following:  Patient instructed on hand hygiene:  Yes  Surgical procedure site care will  be taught and will be reviewed at the time of discharge  Signs and symptoms of infection taught:  Yes  Wound care reviewed and will be taught at the time of discharge  Central venous catheter care will be taught at the time of discharge (if applicable)    Post-op follow-up:  Instructional materials used/given/mailed:  Surgical logistics, post op teaching sheet, and surgical scrub

## 2022-03-24 NOTE — PROGRESS NOTES
Patient treated conservatively for perforated appendicitis. Event mid march. Here for discussion re: interval appendectomy.  Home now with 4 days of antibiotics left.  Tolerating full diet without bowel issues.  No fevers chills or other constitutional symptoms. Without pain  Past medical surgical history reviewed noted in chart.  PHYSICAL EXAM  General appearance- healthy, alert, and in no distress.  Skin- Skin color and turgor normal.  No obvious rashes.  Neck- Neck is supple without obvious adenopathy.  Lungs- Respiratory effort unlabored.  Gait- Normal.  Abdomen - soft non distended, non tender without obvious masses.  Impression:  History perforated appendicitis, improving.  Today discussed options of watchful waiting vs interval appendectomy (6-8 weeks out) Patient wished to proceed with surgery.  Recommendation:  Laparoscopic interval appendectomy    A full discussion regarding the alternatives, risks, goals, and potential complications for this surgery was completed today.  The patient understood that the potential problems included but are not limited to:  Infection, bleeding, injury to bowel, chronic pain.    The patient verbally expressed understanding, was given the opportunity for questions, and gives full informed consent for the procedure.      Today the patient was instructed on the need for a preoperative H&P, NPO status prior to surgery, and the need to stop anticoagulants prior to surgery.  Additional educational material, soap, and instructions will be mailed out to the patients home.    The total time spent with this patient was 30 minutes.  Of this time, greater than 50% was spent counseling and coordinating care.

## 2022-03-24 NOTE — LETTER
3/24/2022       RE: Erich Pierce  [address]  [place] [address]     Dear Colleague,    Thank you for referring your patient, Erich Pierce, to the [facility] [place] [facility]. Please see a copy of my visit note below.    Pre and Post op Patient Education/Teaching Flowsheet  Relevant Diagnosis:  Appendectomy  Teaching Topic:  Pre and post op teaching  Person(s) Involved in teaching:  Patient     Motivation Level:  Asks Questions:  Yes  Eager to Learn:  Yes  Cooperative:  Yes  Receptive (willing/able to accept information):  Yes  Any cultural factors/Voodoo beliefs that may influence understanding or compliance?  No    Patient/caregiver/family demonstrates understanding of the following:  Reason for the appointment, diagnosis, and treatment plan:  Yes  Patient demonstrates understanding of the following:  Pre-op bowel prep:  No  Post-op pain management recommendations (medications, ice compress, binder/athletic supporter (if applicable), etc.:  Yes  Inguinal hernia patients:  Post-op urinary retention- discussed signs/symptoms and visit to ER for Croft catheter placement and to stay in place for at least 48 hours:  NA  Restrictions:  Yes  Medications to take the day of surgery:  Per PCP  Blood thinner medications discussed and when to stop (if applicable):  Yes  Wound care:  Yes  Diabetes medication management (if applicable):  Per PCP  Which situations necessitate calling provider and whom to contact:  Discussed how to contact the hospital, nurse, and clinic scheduling staff if necessary      Date and time of surgery:  TBD  Location of surgery: [facility] [place]- 5th Floor  History and Physical and any other testing necessary prior to surgery:  Yes  Required time line for completion of History and Physical and any pre-op testing:  Yes  Discuss need for someone to drive patient home and  stay with them for 24 hours:  Yes  Pre-op showering/scrub information with Surgical Scrub:  Yes  NPO Guidelines:  NPO per Anesthesia Guidelines  COVID-19 Testing:  Yes    Infection Prevention: Patient demonstrates understanding of the following:  Patient instructed on hand hygiene:  Yes  Surgical procedure site care will be taught and will be reviewed at the time of discharge  Signs and symptoms of infection taught:  Yes  Wound care reviewed and will be taught at the time of discharge  Central venous catheter care will be taught at the time of discharge (if applicable)    Post-op follow-up:  Instructional materials used/given/mailed:  Surgical logistics, post op teaching sheet, and surgical scrub              Patient treated conservatively for perforated appendicitis. Event mid march. Here for discussion re: interval appendectomy.  Home now with 4 days of antibiotics left.  Tolerating full diet without bowel issues.  No fevers chills or other constitutional symptoms. Without pain  Past medical surgical history reviewed noted in chart.  PHYSICAL EXAM  General appearance- healthy, alert, and in no distress.  Skin- Skin color and turgor normal.  No obvious rashes.  Neck- Neck is supple without obvious adenopathy.  Lungs- Respiratory effort unlabored.  Gait- Normal.  Abdomen - soft non distended, non tender without obvious masses.  Impression:  History perforated appendicitis, improving.  Today discussed options of watchful waiting vs interval appendectomy (6-8 weeks out) Patient wished to proceed with surgery.  Recommendation:  Laparoscopic interval appendectomy    A full discussion regarding the alternatives, risks, goals, and potential complications for this surgery was completed today.  The patient understood that the potential problems included but are not limited to:  Infection, bleeding, injury to bowel, chronic pain.    The patient verbally expressed understanding, was given the opportunity for questions, and  gives full informed consent for the procedure.      Today the patient was instructed on the need for a preoperative H&P, NPO status prior to surgery, and the need to stop anticoagulants prior to surgery.  Additional educational material, soap, and instructions will be mailed out to the patients home.    The total time spent with this patient was 30 minutes.  Of this time, greater than 50% was spent counseling and coordinating care.          Gerardo Sidhu MD

## 2022-03-24 NOTE — PATIENT INSTRUCTIONS
You met with Dr. Gerardo Sidhu.      Today's visit instructions:    Reminder:  Surgery Requirements  1. Your surgery will be at [facility] [place]- 5th Floor  2. You will need to arrive 1.5 - 2 hours early based on the location of your surgery ([facility] 1.5 hours, [facility]  2 hours).  3. You will need someone to drive you home (over 18 years old) and stay with you for 24 hours after the procedure.  4. You will need a preop physical with your regular doctor (or PAC if requested by your surgeon) within 30 days of surgery- closer is always better.  5. Stop any blood thinners, vitamins, minerals, or herbal supplements 5 days before surgery.  If you are taking a prescribed blood thinner please let us know for specific instructions.  6. Fasting- a nurse from [facility] will call you 1-2 days before surgery to confirm your procedure and tell you when to stop eating and drinking.   7. Wash with the soap (Antibacterial, Dial Complete Foam, Hibiclense, or soap given/mailed from the clinic) the night before surgery and morning of surgery. See instructions in the Surgery Packet.  8. You will need to undergo a COVID-19 test 2-4 days prior to your scheduled procedure.  Our surgery scheduler will help arrange testing.  9. If you would like a procedure estimate please call Kailey AKANKSHA Financial Counselor at [phone] or [phone].    If you have questions please contact Tamela RN or Martha RN during regular clinic hours, Monday through Friday 7:30 AM - 4:00 PM, or you can contact us via [email or web address] at anytime.       If you have urgent needs after-hours, weekends, or holidays please call the hospital at [phone] and ask to speak with our on-call General Surgery Team.    Appointment [phone]  Nurse Advice (Tamela or Martha): [phone]   Surgery Scheduler (Magdiel): [phone]  Fax: [phone]      After your Laparoscopic/Robotic Appendectomy        Incision care     You may take  a shower the day after surgery. Carefully wash your incision with soap and water. Do not submerge yourself in water (bath, whirlpool, hot tub, pool, lake) for 14 days after surgery.     Remove the bandage the day after surgery, but leave the medical tape (Steri-Strips) or glue in place. These will loosen and fall off on their own 1-2 weeks after surgery.       Always wash your hands before touching your incisions or removing bandages.     It is not unusual to form a collection of fluid or blood under your incision that may feel firm or squishy- it can take several weeks to months for your body to reabsorb it.  At times, it may even drain.  If that should happen keep the area clean with soap, water,  and cover with a clean gauze dressing. You can change this daily or as needed.       Other medicines     Wait to start aspirin or blood thinners until the day after surgery. You can continue your regular medicines at your normal time the day after surgery.      Your pain medicine may cause constipation (hard, dry stools). To help with this, take the stool softener your doctor gave you or an over-the-counter stool softener or laxative. You can stop taking this when you are no longer taking pain medicine and your bowel movements are back to normal.      For pain or discomfort     Take the narcotic pain medicine your doctor gave you as needed and as instructed on the bottle. If you prefer to use over-the-counter medication, use acetaminophen (Tylenol) or ibuprofen (Advil, Motrin) as instructed on the box. Do not take Tylenol if it is in your narcotic pain medication.     Use an ice pack on your abdomen (belly) for 20 minutes at a time as needed for the first 24 hours. Be sure to protect your skin by putting a cloth between the ice pack and your skin.     After 24 hours you can switch to heat for 20 minutes as needed. Be sure to protect your skin by putting a cloth between the heat pack and your skin.     You may experience  right shoulder pain after surgery which will go away 1-4 days after your procedure.  This is related to the gas that was used to inflate your abdomen, it gets trapped between your liver and diaphragm.  Walk frequently and apply a heating pad to the area (protecting your skin from the heating pad with a barrier such as a towel.     Activities     No driving until you feel it s safe to do so. Don t drive while taking narcotic pain medicine.     Don t lift anything heavier than 20 pounds for 3 to 4 weeks after surgery.      Special equipment     None     Diet     You can eat your regular meals after surgery.      When to call the doctor   Call your doctor if you have:     A fever above 101 F (38.3 C) (taken under the tongue), or a fever or chills lasting more than a day.     Redness at the incision site.     Any fluid or blood draining from the incision, especially if it smells bad.      Severe pain that doesn t improve with pain medicine.        We will call you 2 to 4 days after surgery to review this handout, answer questions and help arrange after-surgery care. If you have questions or concerns, please call [phone] during regular office hours. If you need to call after business hours, call [phone] and ask to page the surgeon on-call.

## 2022-03-28 ENCOUNTER — TELEPHONE (OUTPATIENT)
Dept: SURGERY | Facility: CLINIC | Age: 42
End: 2022-03-28
Payer: COMMERCIAL

## 2022-03-28 DIAGNOSIS — Z11.59 ENCOUNTER FOR SCREENING FOR OTHER VIRAL DISEASES: Primary | ICD-10-CM

## 2022-03-28 PROBLEM — K36: Status: ACTIVE | Noted: 2022-03-28

## 2022-03-28 NOTE — TELEPHONE ENCOUNTER
Patient is scheduled for surgery with Dr. Sidhu    Spoke with: Tye    Date of Surgery: 5/9/22    Location[facility]    Informed patient they will need an adult : yes    Pre op with Provider n/a    H&P: Patient will schedule with PCP    Pre-procedure COVID-19 Test: 5/6/22    Additional imaging/appointments: n/a    Surgery packet: Sent via [employer]     Additional comments: n/a

## 2022-04-25 ENCOUNTER — PREP FOR PROCEDURE (OUTPATIENT)
Dept: SURGERY | Facility: CLINIC | Age: 42
End: 2022-04-25
Payer: COMMERCIAL

## 2022-05-04 ENCOUNTER — OFFICE VISIT (OUTPATIENT)
Dept: INTERNAL MEDICINE | Facility: CLINIC | Age: 42
End: 2022-05-04
Payer: COMMERCIAL

## 2022-05-04 VITALS
OXYGEN SATURATION: 98 % | WEIGHT: 183 LBS | BODY MASS INDEX: 29.41 KG/M2 | HEART RATE: 66 BPM | SYSTOLIC BLOOD PRESSURE: 122 MMHG | HEIGHT: 66 IN | TEMPERATURE: 98.5 F | DIASTOLIC BLOOD PRESSURE: 64 MMHG

## 2022-05-04 DIAGNOSIS — Z01.818 PREOPERATIVE EXAMINATION: Primary | ICD-10-CM

## 2022-05-04 DIAGNOSIS — K35.80 ACUTE APPENDICITIS, UNSPECIFIED ACUTE APPENDICITIS TYPE: ICD-10-CM

## 2022-05-04 DIAGNOSIS — R31.0 GROSS HEMATURIA: ICD-10-CM

## 2022-05-04 LAB
ALBUMIN UR-MCNC: 100 MG/DL
ANION GAP SERPL CALCULATED.3IONS-SCNC: 11 MMOL/L (ref 5–18)
APPEARANCE UR: CLEAR
BACTERIA #/AREA URNS HPF: ABNORMAL /HPF
BASOPHILS # BLD AUTO: 0 10E3/UL (ref 0–0.2)
BASOPHILS NFR BLD AUTO: 0 %
BILIRUB UR QL STRIP: NEGATIVE
BUN SERPL-MCNC: 24 MG/DL (ref 8–22)
CALCIUM SERPL-MCNC: 10.1 MG/DL (ref 8.5–10.5)
CHLORIDE BLD-SCNC: 106 MMOL/L (ref 98–107)
CO2 SERPL-SCNC: 22 MMOL/L (ref 22–31)
COLOR UR AUTO: YELLOW
CREAT SERPL-MCNC: 1.12 MG/DL (ref 0.7–1.3)
EOSINOPHIL # BLD AUTO: 0.2 10E3/UL (ref 0–0.7)
EOSINOPHIL NFR BLD AUTO: 2 %
ERYTHROCYTE [DISTWIDTH] IN BLOOD BY AUTOMATED COUNT: 12 % (ref 10–15)
GFR SERPL CREATININE-BSD FRML MDRD: 84 ML/MIN/1.73M2
GLUCOSE BLD-MCNC: 127 MG/DL (ref 70–125)
GLUCOSE UR STRIP-MCNC: NEGATIVE MG/DL
HCT VFR BLD AUTO: 45.9 % (ref 40–53)
HGB BLD-MCNC: 15.1 G/DL (ref 13.3–17.7)
HGB UR QL STRIP: ABNORMAL
IMM GRANULOCYTES # BLD: 0 10E3/UL
IMM GRANULOCYTES NFR BLD: 0 %
KETONES UR STRIP-MCNC: NEGATIVE MG/DL
LEUKOCYTE ESTERASE UR QL STRIP: NEGATIVE
LYMPHOCYTES # BLD AUTO: 2.5 10E3/UL (ref 0.8–5.3)
LYMPHOCYTES NFR BLD AUTO: 29 %
MCH RBC QN AUTO: 28 PG (ref 26.5–33)
MCHC RBC AUTO-ENTMCNC: 32.9 G/DL (ref 31.5–36.5)
MCV RBC AUTO: 85 FL (ref 78–100)
MONOCYTES # BLD AUTO: 0.6 10E3/UL (ref 0–1.3)
MONOCYTES NFR BLD AUTO: 7 %
NEUTROPHILS # BLD AUTO: 5.3 10E3/UL (ref 1.6–8.3)
NEUTROPHILS NFR BLD AUTO: 62 %
NITRATE UR QL: NEGATIVE
PH UR STRIP: 5.5 [PH] (ref 5–8)
PLATELET # BLD AUTO: 222 10E3/UL (ref 150–450)
POTASSIUM BLD-SCNC: 4.1 MMOL/L (ref 3.5–5)
RBC # BLD AUTO: 5.39 10E6/UL (ref 4.4–5.9)
RBC #/AREA URNS AUTO: ABNORMAL /HPF
SODIUM SERPL-SCNC: 139 MMOL/L (ref 136–145)
SP GR UR STRIP: >=1.03 (ref 1–1.03)
SQUAMOUS #/AREA URNS AUTO: ABNORMAL /LPF
UROBILINOGEN UR STRIP-ACNC: 0.2 E.U./DL
WBC # BLD AUTO: 8.5 10E3/UL (ref 4–11)
WBC #/AREA URNS AUTO: ABNORMAL /HPF

## 2022-05-04 PROCEDURE — 81001 URINALYSIS AUTO W/SCOPE: CPT | Performed by: INTERNAL MEDICINE

## 2022-05-04 PROCEDURE — 80048 BASIC METABOLIC PNL TOTAL CA: CPT | Performed by: INTERNAL MEDICINE

## 2022-05-04 PROCEDURE — 99214 OFFICE O/P EST MOD 30 MIN: CPT | Performed by: INTERNAL MEDICINE

## 2022-05-04 PROCEDURE — 36415 COLL VENOUS BLD VENIPUNCTURE: CPT | Performed by: INTERNAL MEDICINE

## 2022-05-04 PROCEDURE — 85025 COMPLETE CBC W/AUTO DIFF WBC: CPT | Performed by: INTERNAL MEDICINE

## 2022-05-04 RX ORDER — LORATADINE 10 MG/1
10 TABLET ORAL DAILY
COMMUNITY

## 2022-05-04 RX ORDER — CETIRIZINE HYDROCHLORIDE 10 MG/1
10 TABLET ORAL DAILY
COMMUNITY

## 2022-05-04 NOTE — PROGRESS NOTES
[facility]  [address] [place] [address]  Phone: [phone]  Fax: [phone]  Primary Provider: Jose Harris  Pre-op Performing Provider: JOSE HARRIS      PREOPERATIVE EVALUATION:  Today's date: 5/4/2022    Erich Pierce is a 41 year old male who presents for a preoperative evaluation.    Surgical Information:  Surgery/Procedure: Appendectomy   Surgery Location: [facility]   Surgeon: Dr. Kamar Sidhu   Surgery Date: 5/9/22  Time of Surgery: 8:30 am   Where patient plans to recover: At home with family  Fax number for surgical facility: Note does not need to be faxed, will be available electronically in Epic.    Type of Anesthesia Anticipated: to be determined    1. Preoperative examination  Proceed with surgery as planned.  He was given perioperative med management instructions.  - UA Macro with Reflex to Micro and Culture - lab collect; Future  - CBC with platelets and differential; Future  - Basic metabolic panel  (Ca, Cl, CO2, Creat, Gluc, K, Na, BUN); Future    2. Acute appendicitis, unspecified acute appendicitis type  As above.    3. Gross hematuria  Presumably this was from the appendicitis and adjacent inflammation.  Will repeat urinalysis.  If microscopic or gross hematuria persists or recurs he needs to be evaluated by urology and I discussed this with him.  - UA Macro with Reflex to Micro and Culture - lab collect; Future  - CBC with platelets and differential; Future  - Basic metabolic panel  (Ca, Cl, CO2, Creat, Gluc, K, Na, BUN); Future    Subjective     HPI related to upcoming procedure: Tye comes in I for preop.  He presented to an outside institution with acute abdominal pain.  He was found to have ruptured appendix with abscess.  It was felt that he should be treated conservatively with antibiotics.  He was admitted to hospital for couple days.  Let things cool off.  Sent home on oral  antibiotics.  He is now going to have a elective appendectomy.  He is feeling well.  He did have a bout 3 weeks of gross hematuria around the time of the appendicitis.  He has had no further hematuria.  No prior history of hematuria    Preop Questions 5/4/2022   1. Have you ever had a heart attack or stroke? No   2. Have you ever had surgery on your heart or blood vessels, such as a stent placement, a coronary artery bypass, or surgery on an artery in your head, neck, heart, or legs? No   3. Do you have chest pain with activity? No   4. Do you have a history of  heart failure? No   5. Do you currently have a cold, bronchitis or symptoms of other infection? YES - no covid related    6. Do you have a cough, shortness of breath, or wheezing? No   7. Do you or anyone in your family have previous history of blood clots? No   8. Do you or does anyone in your family have a serious bleeding problem such as prolonged bleeding following surgeries or cuts? No   9. Have you ever had problems with anemia or been told to take iron pills? No   10. Have you had any abnormal blood loss such as black, tarry or bloody stools? No   11. Have you ever had a blood transfusion? No   12. Are you willing to have a blood transfusion if it is medically needed before, during, or after your surgery? Yes   13. Have you or any of your relatives ever had problems with anesthesia? No   14. Do you have sleep apnea, excessive snoring or daytime drowsiness? No   15. Do you have any artifical heart valves or other implanted medical devices like a pacemaker, defibrillator, or continuous glucose monitor? No   16. Do you have artificial joints? No   17. Are you allergic to latex? No       Health Care Directive:  Patient does not have a Health Care Directive or Living Will: Patient states has Advance Directive and will bring in a copy to clinic.    Preoperative Review of :   reviewed - no record of controlled substances prescribed.      Status of  Chronic Conditions:  See problem list for active medical problems.  Problems all longstanding and stable, except as noted/documented.  See ROS for pertinent symptoms related to these conditions.      Review of Systems  Constitutional, neuro, ENT, endocrine, pulmonary, cardiac, gastrointestinal, genitourinary, musculoskeletal, integument and psychiatric systems are negative, except as otherwise noted.    Patient Active Problem List    Diagnosis Date Noted     Grumbling appendix 03/28/2022     Priority: Medium     Added automatically from request for surgery [id number]       Acute abdominal pain 03/18/2022     Priority: Medium     Acute appendicitis, unspecified acute appendicitis type 03/18/2022     Priority: Medium     Vitamin D deficiency 06/25/2019     Priority: Medium     Multiple food allergies 11/28/2016     Priority: Medium     Multiple environmental allergies 11/28/2016     Priority: Medium     Essential Hypertriglyceridemia      Priority: Medium     Created by Conversion         Serum Enzyme Levels - ALT (SGPT) Elevated      Priority: Medium     Created by Conversion          Past Medical History:   Diagnosis Date     Essential hypertriglyceridemia      Pain, joint, shoulder      Temporal arteritis (H)      Past Surgical History:   Procedure Laterality Date     SKIN GRAFT  1990     WISDOM TOOTH EXTRACTION  2006     Current Outpatient Medications   Medication Sig Dispense Refill     cetirizine (ZYRTEC ALLERGY) 10 MG tablet Take 10 mg by mouth daily       cholecalciferol, vitamin D3, 125 mcg (5,000 unit) capsule [CHOLECALCIFEROL, VITAMIN D3, 125 MCG (5,000 UNIT) CAPSULE] Take 5,000 Units by mouth daily.       fenofibrate micronized (LOFIBRA) 200 MG capsule [FENOFIBRATE MICRONIZED (LOFIBRA) 200 MG CAPSULE] Take 1 capsule (200 mg total) by mouth daily before breakfast. 90 capsule 3     loratadine (CLARITIN) 10 MG tablet Take 10 mg by mouth daily       Multiple Vitamin (MULTIVITAMIN ADULT PO) Take 1 tablet by mouth  "daily       rosuvastatin (CRESTOR) 10 MG tablet [ROSUVASTATIN (CRESTOR) 10 MG TABLET] Take 1 tablet (10 mg total) by mouth at bedtime. 90 tablet 3       No Known Allergies     Social History     Tobacco Use     Smoking status: Never Smoker     Smokeless tobacco: Never Used   Substance Use Topics     Alcohol use: Not on file     Family History   Problem Relation Age of Onset     Leukemia Father          age 64 of acute myelogenous leukimia     Depression Mother      No Known Problems Sister      No Known Problems Daughter      History   Drug Use Not on file         Objective     Ht 1.676 m (5' 6\")   Wt 83 kg (183 lb)   BMI 29.54 kg/m      Physical Exam    GENERAL APPEARANCE: healthy, alert and no distress     EYES: EOMI,  PERRL     HENT: ear canals and TM's normal and normal cephalic/atraumatic     NECK: no adenopathy, no asymmetry, masses, or scars and thyroid normal to palpation     RESP: lungs clear to auscultation - no rales, rhonchi or wheezes     CV: regular rates and rhythm, normal S1 S2, no S3 or S4 and no murmur, click or rub     ABDOMEN:  soft, nontender, no HSM or masses and bowel sounds normal     MS: extremities normal- no gross deformities noted, no evidence of inflammation in joints, FROM in all extremities.     SKIN: no suspicious lesions or rashes     NEURO: Normal strength and tone, sensory exam grossly normal, mentation intact and speech normal     PSYCH: mentation appears normal. and affect normal/bright     LYMPHATICS: No cervical adenopathy    Recent Labs   Lab Test 22  0638 22  0237 21  0950   HGB 13.6  --   --      --   --      --  141   POTASSIUM 3.7 3.6 4.2   CR 1.27*  --  1.04        Diagnostics:  Labs pending at this time.  Results will be reviewed when available.   No EKG required, no history of coronary heart disease, significant arrhythmia, peripheral arterial disease or other structural heart disease.    Revised Cardiac Risk Index (RCRI):  The " patient has the following serious cardiovascular risks for perioperative complications:   - No serious cardiac risks = 0 points     RCRI Interpretation: 0 points: Class I (very low risk - 0.4% complication rate)           Signed Electronically by: JOSE LARA MD  Copy of this evaluation report is provided to requesting physician.

## 2022-05-04 NOTE — PATIENT INSTRUCTIONS
Preparing for Your Surgery  Getting started  A nurse will call you to review your health history and instructions. They will give you an arrival time based on your scheduled surgery time. Please be ready to share:    Your doctor's clinic name and phone number    Your medical, surgical and anesthesia history    A list of allergies and sensitivities    A list of medicines, including herbal treatments and over-the-counter drugs    Whether the patient has a legal guardian (ask how to send us the papers in advance)  Please tell us if you're pregnant--or if there's any chance you might be pregnant. Some surgeries may injure a fetus (unborn baby), so they require a pregnancy test. Surgeries that are safe for a fetus don't always need a test, and you can choose whether to have one.   If you have a child who's having surgery, please ask for a copy of Preparing for Your Child's Surgery.    Preparing for surgery    Within 30 days of surgery: Have a pre-op exam (sometimes called an H&P, or History and Physical). This can be done at a clinic or pre-operative center.  ? If you're having a , you may not need this exam. Talk to your care team.    At your pre-op exam, talk to your care team about all medicines you take. If you need to stop any medicines before surgery, ask when to start taking them again.  ? We do this for your safety. Many medicines can make you bleed too much during surgery. Some change how well surgery (anesthesia) drugs work.    Call your insurance company to let them know you're having surgery. (If you don't have insurance, call [phone].)    Call your clinic if there's any change in your health. This includes signs of a cold or flu (sore throat, runny nose, cough, rash, fever). It also includes a scrape or scratch near the surgery site.    If you have questions on the day of surgery, call your hospital or surgery center.  COVID testing  You may need to be tested for COVID-19 before having  surgery. If so, we will give you instructions.  Eating and drinking guidelines  For your safety: Unless your surgeon tells you otherwise, follow the guidelines below.    Eat and drink as usual until 8 hours before surgery. After that, no food or milk.    Drink clear liquids until 2 hours before surgery. These are liquids you can see through, like water, Gatorade and Propel Water. You may also have black coffee and tea (no cream or milk).    Nothing by mouth within 2 hours of surgery. This includes gum, candy and breath mints.    If you drink alcohol: Stop drinking it the night before surgery.    If your care team tells you to take medicine on the morning of surgery, it's okay to take it with a sip of water.  Preventing infection    Shower or bathe the night before and morning of your surgery. Follow the instructions your clinic gave you. (If no instructions, use regular soap.)    Don't shave or clip hair near your surgery site. We'll remove the hair if needed.    Don't smoke or vape the morning of surgery. You may chew nicotine gum up to 2 hours before surgery. A nicotine patch is okay.  ? Note: Some surgeries require you to completely quit smoking and nicotine. Check with your surgeon.    Your care team will make every effort to keep you safe from infection. We will:  ? Clean our hands often with soap and water (or an alcohol-based hand rub).  ? Clean the skin at your surgery site with a special soap that kills germs.  ? Give you a special gown to keep you warm. (Cold raises the risk of infection.)  ? Wear special hair covers, masks, gowns and gloves during surgery.  ? Give antibiotic medicine, if prescribed. Not all surgeries need antibiotics.  What to bring on the day of surgery    Photo ID and insurance card    Copy of your health care directive, if you have one    Glasses and hearing aides (bring cases)  ? You can't wear contacts during surgery    Inhaler and eye drops, if you use them (tell us about these when  you arrive)    CPAP machine or breathing device, if you use them    A few personal items, if spending the night    If you have . . .  ? A pacemaker, ICD (cardiac defibrillator) or other implant: Bring the ID card.  ? An implanted stimulator: Bring the remote control.  ? A legal guardian: Bring a copy of the certified (court-stamped) guardianship papers.  Please remove any jewelry, including body piercings. Leave jewelry and other valuables at home.  If you're going home the day of surgery    You must have a responsible adult drive you home. They should stay with you overnight as well.    If you don't have someone to stay with you, and you aren't safe to go home alone, we may keep you overnight. Insurance often won't pay for this.  After surgery  If it's hard to control your pain or you need more pain medicine, please call your surgeon's office.  Questions?   If you have any questions for your care team, list them here: _________________________________________________________________________________________________________________________________________________________________________ ____________________________________ ____________________________________ ____________________________________  For informational purposes only. Not to replace the advice of your health care provider. Copyright   2003, 2019 [facility]. All rights reserved. Clinically reviewed by Lor Khanna MD. [employer] [id number] - REV 07/21.    How to Take Your Medication Before Surgery  Don't take any ibuprofen / aleve / aspirin one week prior to surgery.    Hold all medicines the morning of surgery.

## 2022-05-04 NOTE — LETTER
May 6, 2022      Tye Pierce  [address] [address]        Dear ,    We are writing to inform you of your test results.    Tye, your preop labs look ok, and there is no longer blood in the urine, but you do have some protein in the urine.  I want to see you back after the surgery to discuss and recheck.  This can be in a few weeks, after you have recovered.         Resulted Orders   UA Macro with Reflex to Micro and Culture - lab collect   Result Value Ref Range    Color Urine Yellow Colorless, Straw, Light Yellow, Yellow    Appearance Urine Clear Clear    Glucose Urine Negative Negative mg/dL    Bilirubin Urine Negative Negative    Ketones Urine Negative Negative mg/dL    Specific Gravity Urine >=1.030 1.005 - 1.030    Blood Urine Trace (A) Negative    pH Urine 5.5 5.0 - 8.0    Protein Albumin Urine 100  (A) Negative mg/dL    Urobilinogen Urine 0.2 0.2, 1.0 E.U./dL    Nitrite Urine Negative Negative    Leukocyte Esterase Urine Negative Negative   Basic metabolic panel  (Ca, Cl, CO2, Creat, Gluc, K, Na, BUN)   Result Value Ref Range    Sodium 139 136 - 145 mmol/L    Potassium 4.1 3.5 - 5.0 mmol/L    Chloride 106 98 - 107 mmol/L    Carbon Dioxide (CO2) 22 22 - 31 mmol/L    Anion Gap 11 5 - 18 mmol/L    Urea Nitrogen 24 (H) 8 - 22 mg/dL    Creatinine 1.12 0.70 - 1.30 mg/dL    Calcium 10.1 8.5 - 10.5 mg/dL    Glucose 127 (H) 70 - 125 mg/dL    GFR Estimate 84 >60 mL/min/1.73m2      Comment:      Effective December 21, 2021 eGFRcr in adults is calculated using the 2021 CKD-EPI creatinine equation which includes age and gender (Varghese perdue al., NEJM, DOI: 10.1056/ZRMBst0517561)   CBC with platelets and differential   Result Value Ref Range    WBC Count 8.5 4.0 - 11.0 10e3/uL    RBC Count 5.39 4.40 - 5.90 10e6/uL    Hemoglobin 15.1 13.3 - 17.7 g/dL    Hematocrit 45.9 40.0 - 53.0 %    MCV 85 78 - 100 fL    MCH 28.0 26.5 - 33.0 pg    MCHC 32.9 31.5 - 36.5 g/dL    RDW 12.0 10.0 - 15.0 %    Platelet  Count 222 150 - 450 10e3/uL    % Neutrophils 62 %    % Lymphocytes 29 %    % Monocytes 7 %    % Eosinophils 2 %    % Basophils 0 %    % Immature Granulocytes 0 %    Absolute Neutrophils 5.3 1.6 - 8.3 10e3/uL    Absolute Lymphocytes 2.5 0.8 - 5.3 10e3/uL    Absolute Monocytes 0.6 0.0 - 1.3 10e3/uL    Absolute Eosinophils 0.2 0.0 - 0.7 10e3/uL    Absolute Basophils 0.0 0.0 - 0.2 10e3/uL    Absolute Immature Granulocytes 0.0 <=0.4 10e3/uL   Urine Microscopic   Result Value Ref Range    Bacteria Urine None Seen None Seen /HPF    RBC Urine 0-2 0-2 /HPF /HPF    WBC Urine 0-5 0-5 /HPF /HPF    Squamous Epithelials Urine Few (A) None Seen /LPF    Narrative    Urine Culture not indicated       If you have any questions or concerns, please call the clinic at the number listed above.       Sincerely,      Damaso Harris MD

## 2022-05-06 ENCOUNTER — LAB (OUTPATIENT)
Dept: LAB | Facility: CLINIC | Age: 42
End: 2022-05-06
Payer: COMMERCIAL

## 2022-05-06 ENCOUNTER — ANESTHESIA EVENT (OUTPATIENT)
Dept: SURGERY | Facility: AMBULATORY SURGERY CENTER | Age: 42
End: 2022-05-06
Payer: COMMERCIAL

## 2022-05-06 DIAGNOSIS — Z11.59 ENCOUNTER FOR SCREENING FOR OTHER VIRAL DISEASES: ICD-10-CM

## 2022-05-06 PROCEDURE — U0003 INFECTIOUS AGENT DETECTION BY NUCLEIC ACID (DNA OR RNA); SEVERE ACUTE RESPIRATORY SYNDROME CORONAVIRUS 2 (SARS-COV-2) (CORONAVIRUS DISEASE [COVID-19]), AMPLIFIED PROBE TECHNIQUE, MAKING USE OF HIGH THROUGHPUT TECHNOLOGIES AS DESCRIBED BY CMS-2020-01-R: HCPCS

## 2022-05-06 PROCEDURE — U0005 INFEC AGEN DETEC AMPLI PROBE: HCPCS

## 2022-05-07 LAB — SARS-COV-2 RNA RESP QL NAA+PROBE: NEGATIVE

## 2022-05-09 ENCOUNTER — HOSPITAL ENCOUNTER (OUTPATIENT)
Facility: AMBULATORY SURGERY CENTER | Age: 42
Discharge: HOME | End: 2022-05-09
Attending: SURGERY
Payer: COMMERCIAL

## 2022-05-09 ENCOUNTER — ANESTHESIA (OUTPATIENT)
Dept: SURGERY | Facility: AMBULATORY SURGERY CENTER | Age: 42
End: 2022-05-09
Payer: COMMERCIAL

## 2022-05-09 VITALS
RESPIRATION RATE: 16 BRPM | OXYGEN SATURATION: 98 % | HEIGHT: 66 IN | BODY MASS INDEX: 28.93 KG/M2 | DIASTOLIC BLOOD PRESSURE: 88 MMHG | WEIGHT: 180 LBS | HEART RATE: 59 BPM | TEMPERATURE: 96.4 F | SYSTOLIC BLOOD PRESSURE: 126 MMHG

## 2022-05-09 DIAGNOSIS — K36 GRUMBLING APPENDIX: ICD-10-CM

## 2022-05-09 PROCEDURE — 88304 TISSUE EXAM BY PATHOLOGIST: CPT | Mod: TC | Performed by: SURGERY

## 2022-05-09 PROCEDURE — 44970 LAPAROSCOPY APPENDECTOMY: CPT

## 2022-05-09 PROCEDURE — 44970 LAPAROSCOPY APPENDECTOMY: CPT | Performed by: SURGERY

## 2022-05-09 PROCEDURE — 88304 TISSUE EXAM BY PATHOLOGIST: CPT | Mod: 26 | Performed by: PATHOLOGY

## 2022-05-09 RX ORDER — ONDANSETRON 2 MG/ML
INJECTION INTRAMUSCULAR; INTRAVENOUS PRN
Status: DISCONTINUED | OUTPATIENT
Start: 2022-05-09 | End: 2022-05-09

## 2022-05-09 RX ORDER — LIDOCAINE HYDROCHLORIDE 20 MG/ML
INJECTION, SOLUTION INFILTRATION; PERINEURAL PRN
Status: DISCONTINUED | OUTPATIENT
Start: 2022-05-09 | End: 2022-05-09

## 2022-05-09 RX ORDER — FENTANYL CITRATE 50 UG/ML
25 INJECTION, SOLUTION INTRAMUSCULAR; INTRAVENOUS
Status: DISCONTINUED | OUTPATIENT
Start: 2022-05-09 | End: 2022-05-10 | Stop reason: HOSPADM

## 2022-05-09 RX ORDER — CEFAZOLIN SODIUM 2 G/50ML
2 SOLUTION INTRAVENOUS
Status: COMPLETED | OUTPATIENT
Start: 2022-05-09 | End: 2022-05-09

## 2022-05-09 RX ORDER — GLYCOPYRROLATE 0.2 MG/ML
INJECTION, SOLUTION INTRAMUSCULAR; INTRAVENOUS PRN
Status: DISCONTINUED | OUTPATIENT
Start: 2022-05-09 | End: 2022-05-09

## 2022-05-09 RX ORDER — ONDANSETRON 4 MG/1
4 TABLET, ORALLY DISINTEGRATING ORAL EVERY 30 MIN PRN
Status: DISCONTINUED | OUTPATIENT
Start: 2022-05-09 | End: 2022-05-10 | Stop reason: HOSPADM

## 2022-05-09 RX ORDER — DEXAMETHASONE SODIUM PHOSPHATE 4 MG/ML
INJECTION, SOLUTION INTRA-ARTICULAR; INTRALESIONAL; INTRAMUSCULAR; INTRAVENOUS; SOFT TISSUE PRN
Status: DISCONTINUED | OUTPATIENT
Start: 2022-05-09 | End: 2022-05-09

## 2022-05-09 RX ORDER — SODIUM CHLORIDE, SODIUM LACTATE, POTASSIUM CHLORIDE, CALCIUM CHLORIDE 600; 310; 30; 20 MG/100ML; MG/100ML; MG/100ML; MG/100ML
INJECTION, SOLUTION INTRAVENOUS CONTINUOUS
Status: DISCONTINUED | OUTPATIENT
Start: 2022-05-09 | End: 2022-05-09 | Stop reason: HOSPADM

## 2022-05-09 RX ORDER — KETAMINE HYDROCHLORIDE 10 MG/ML
INJECTION INTRAMUSCULAR; INTRAVENOUS PRN
Status: DISCONTINUED | OUTPATIENT
Start: 2022-05-09 | End: 2022-05-09

## 2022-05-09 RX ORDER — FENTANYL CITRATE 50 UG/ML
INJECTION, SOLUTION INTRAMUSCULAR; INTRAVENOUS PRN
Status: DISCONTINUED | OUTPATIENT
Start: 2022-05-09 | End: 2022-05-09

## 2022-05-09 RX ORDER — FENTANYL CITRATE 50 UG/ML
25 INJECTION, SOLUTION INTRAMUSCULAR; INTRAVENOUS EVERY 5 MIN PRN
Status: DISCONTINUED | OUTPATIENT
Start: 2022-05-09 | End: 2022-05-10 | Stop reason: HOSPADM

## 2022-05-09 RX ORDER — SODIUM CHLORIDE, SODIUM LACTATE, POTASSIUM CHLORIDE, CALCIUM CHLORIDE 600; 310; 30; 20 MG/100ML; MG/100ML; MG/100ML; MG/100ML
INJECTION, SOLUTION INTRAVENOUS CONTINUOUS
Status: DISCONTINUED | OUTPATIENT
Start: 2022-05-09 | End: 2022-05-10 | Stop reason: HOSPADM

## 2022-05-09 RX ORDER — BUPIVACAINE HYDROCHLORIDE 5 MG/ML
INJECTION, SOLUTION PERINEURAL PRN
Status: DISCONTINUED | OUTPATIENT
Start: 2022-05-09 | End: 2022-05-09 | Stop reason: HOSPADM

## 2022-05-09 RX ORDER — ACETAMINOPHEN 325 MG/1
975 TABLET ORAL ONCE
Status: DISCONTINUED | OUTPATIENT
Start: 2022-05-09 | End: 2022-05-10 | Stop reason: HOSPADM

## 2022-05-09 RX ORDER — LIDOCAINE 40 MG/G
CREAM TOPICAL
Status: DISCONTINUED | OUTPATIENT
Start: 2022-05-09 | End: 2022-05-09 | Stop reason: HOSPADM

## 2022-05-09 RX ORDER — KETOROLAC TROMETHAMINE 30 MG/ML
INJECTION, SOLUTION INTRAMUSCULAR; INTRAVENOUS PRN
Status: DISCONTINUED | OUTPATIENT
Start: 2022-05-09 | End: 2022-05-09

## 2022-05-09 RX ORDER — PROPOFOL 10 MG/ML
INJECTION, EMULSION INTRAVENOUS CONTINUOUS PRN
Status: DISCONTINUED | OUTPATIENT
Start: 2022-05-09 | End: 2022-05-09

## 2022-05-09 RX ORDER — OXYCODONE HYDROCHLORIDE 5 MG/1
5 TABLET ORAL EVERY 4 HOURS PRN
Status: DISCONTINUED | OUTPATIENT
Start: 2022-05-09 | End: 2022-05-10 | Stop reason: HOSPADM

## 2022-05-09 RX ORDER — HYDROCODONE BITARTRATE AND ACETAMINOPHEN 5; 325 MG/1; MG/1
1-2 TABLET ORAL EVERY 4 HOURS PRN
Qty: 15 TABLET | Refills: 0 | Status: SHIPPED | OUTPATIENT
Start: 2022-05-09 | End: 2022-05-26

## 2022-05-09 RX ORDER — ONDANSETRON 2 MG/ML
4 INJECTION INTRAMUSCULAR; INTRAVENOUS EVERY 30 MIN PRN
Status: DISCONTINUED | OUTPATIENT
Start: 2022-05-09 | End: 2022-05-10 | Stop reason: HOSPADM

## 2022-05-09 RX ORDER — AMOXICILLIN 250 MG
1-2 CAPSULE ORAL 2 TIMES DAILY
Qty: 15 TABLET | Refills: 0 | Status: SHIPPED | OUTPATIENT
Start: 2022-05-09 | End: 2022-05-26

## 2022-05-09 RX ORDER — HYDROMORPHONE HYDROCHLORIDE 1 MG/ML
0.2 INJECTION, SOLUTION INTRAMUSCULAR; INTRAVENOUS; SUBCUTANEOUS EVERY 5 MIN PRN
Status: DISCONTINUED | OUTPATIENT
Start: 2022-05-09 | End: 2022-05-10 | Stop reason: HOSPADM

## 2022-05-09 RX ORDER — CEFAZOLIN SODIUM 2 G/50ML
2 SOLUTION INTRAVENOUS SEE ADMIN INSTRUCTIONS
Status: DISCONTINUED | OUTPATIENT
Start: 2022-05-09 | End: 2022-05-09 | Stop reason: HOSPADM

## 2022-05-09 RX ORDER — PROPOFOL 10 MG/ML
INJECTION, EMULSION INTRAVENOUS PRN
Status: DISCONTINUED | OUTPATIENT
Start: 2022-05-09 | End: 2022-05-09

## 2022-05-09 RX ADMIN — LIDOCAINE HYDROCHLORIDE 80 MG: 20 INJECTION, SOLUTION INFILTRATION; PERINEURAL at 08:23

## 2022-05-09 RX ADMIN — KETOROLAC TROMETHAMINE 30 MG: 30 INJECTION, SOLUTION INTRAMUSCULAR; INTRAVENOUS at 08:54

## 2022-05-09 RX ADMIN — PROPOFOL 150 MG: 10 INJECTION, EMULSION INTRAVENOUS at 08:24

## 2022-05-09 RX ADMIN — FENTANYL CITRATE 50 MCG: 50 INJECTION, SOLUTION INTRAMUSCULAR; INTRAVENOUS at 08:29

## 2022-05-09 RX ADMIN — SODIUM CHLORIDE, SODIUM LACTATE, POTASSIUM CHLORIDE, CALCIUM CHLORIDE: 600; 310; 30; 20 INJECTION, SOLUTION INTRAVENOUS at 08:17

## 2022-05-09 RX ADMIN — KETAMINE HYDROCHLORIDE 30 MG: 10 INJECTION INTRAMUSCULAR; INTRAVENOUS at 08:41

## 2022-05-09 RX ADMIN — GLYCOPYRROLATE 0.2 MG: 0.2 INJECTION, SOLUTION INTRAMUSCULAR; INTRAVENOUS at 08:22

## 2022-05-09 RX ADMIN — OXYCODONE HYDROCHLORIDE 5 MG: 5 TABLET ORAL at 09:58

## 2022-05-09 RX ADMIN — FENTANYL CITRATE 50 MCG: 50 INJECTION, SOLUTION INTRAMUSCULAR; INTRAVENOUS at 08:23

## 2022-05-09 RX ADMIN — CEFAZOLIN SODIUM 2 G: 2 SOLUTION INTRAVENOUS at 08:37

## 2022-05-09 RX ADMIN — Medication 40 MG: at 08:24

## 2022-05-09 RX ADMIN — ONDANSETRON 4 MG: 2 INJECTION INTRAMUSCULAR; INTRAVENOUS at 08:29

## 2022-05-09 RX ADMIN — Medication 10 MG: at 08:33

## 2022-05-09 RX ADMIN — PROPOFOL 200 MCG/KG/MIN: 10 INJECTION, EMULSION INTRAVENOUS at 08:25

## 2022-05-09 RX ADMIN — DEXAMETHASONE SODIUM PHOSPHATE 4 MG: 4 INJECTION, SOLUTION INTRA-ARTICULAR; INTRALESIONAL; INTRAMUSCULAR; INTRAVENOUS; SOFT TISSUE at 08:25

## 2022-05-09 NOTE — DISCHARGE INSTRUCTIONS
[facility]  Home Care Following Anesthesia  For 24 hours after surgery:  Get plenty of rest.  A responsible adult must stay with you for at least 24 hours after you leave the surgery center.  Do not drive or use heavy equipment.  If you have weakness or tingling, don't drive or use heavy equipment until this feeling goes away.   Do not drink alcohol.   Avoid strenuous or risky activities.  Ask for help when climbing stairs.  You may feel lightheaded.  IF so, sit for a few minutes before standing.  Have someone help you get up.   If you have nausea (feel sick to your stomach): Drink only clear liquids such as apple juice, ginger ale, broth or 7-Up.  Rest may also help.  Be sure to drink enough fluids.  Move to a regular diet as you feel able.   You may have a slight fever.  Call the doctor if your fever is over 100 F (37.7 C) (taken under the tongue) or lasts longer than 24 hours.  You may have a dry mouth, a sore throat, muscle aches or trouble sleeping. These should go away after 24 hours.  Do not make important or legal decisions.   It is recommended to avoid smoking.     Tips for taking pain medications  To get the best pain relief possible, remember these points:  Take pain medications as directed, before pain becomes severe.  Pain medication can upset your stomach: taking it with food may help.  Constipation is a common side effect of pain medication. Drink plenty of  fluids.  Eat foods high in fiber. Take a stool softener if recommended by your doctor or pharmacist.  Do not drink alcohol, drive or operate machinery while taking pain medications.  Ask about other ways to control pain, such as with heat, ice or relaxation.    In general, the best strategy is to take (if you are able to tolerate it) the tylenol and ibuprofen on a regular basis until your pain has largely gone away. You can take the narcotic pain medicine as needed in addition to the tylenol and ibuprofen. As  your pain begins to lessen, you should cut back on your narcotic use while continuing to take your regular tylenol and ibuprofen doses.       Tylenol/Acetaminophen Consumption  To help encourage the safe use of acetaminophen, the makers of TYLENOL  have lowered the maximum daily dose for single-ingredient Extra Strength TYLENOL  (acetaminophen) products sold in the [place] from 8 pills per day (4,000 mg) to 6 pills per day (3,000 mg). The dosing interval has also changed from 2 pills every 4-6 hours to 2 pills every 6 hours.  If you feel your pain relief is insufficient, you may take Tylenol/Acetaminophen in addition to your narcotic pain medication.   Be careful not to exceed 3,000 mg of Tylenol/Acetaminophen in a 24 hour period from all sources.  If you are taking extra strength Tylenol/acetaminophen (500 mg), the maximum dose is 6 tablets in 24 hours.  If you are taking regular strength acetaminophen (325 mg), the maximum dose is 9 tablets in 24 hours.    Call a doctor for any of the following:  Signs of infection (fever, growing tenderness at the surgery site, a large amount of drainage or bleeding, severe pain, foul-smelling drainage, redness, swelling).  It has been over 8 to 10 hours since surgery and you are still not able to urinate (pass water).  Headache for over 24 hours.  Numbness, tingling or weakness the day after surgery (if you had spinal anesthesia).  Signs of Covid-19 infection (temperature over 100 degrees, shortness of breath, cough, loss of taste/smell, generalized body aches, persistent headache, chills, sore throat, nausea/vomiting/diarrhea)  Your doctor is:  Dr. Gerardo Sidhu, General Surgery: [phone]                    Or dial [phone] and ask for the resident on call for:  General Surgery  For emergency care, call the:  [place]:  [phone] (TTY for hearing impaired: [phone])

## 2022-05-09 NOTE — ANESTHESIA PREPROCEDURE EVALUATION
Anesthesia Pre-Procedure Evaluation    Patient: Erich Pierce   MRN: [id number] : 1980        Procedure : Procedure(s):  APPENDECTOMY, ROBOT-ASSISTED          Past Medical History:   Diagnosis Date     Essential hypertriglyceridemia      Pain, joint, shoulder      Temporal arteritis (H)       Past Surgical History:   Procedure Laterality Date     SKIN GRAFT       WISDOM TOOTH EXTRACTION        No Known Allergies   Social History     Tobacco Use     Smoking status: Never Smoker     Smokeless tobacco: Never Used   Substance Use Topics     Alcohol use: Not on file      Wt Readings from Last 1 Encounters:   22 81.6 kg (180 lb)        Anesthesia Evaluation   Pt has had prior anesthetic. Type: General.    No history of anesthetic complications       ROS/MED HX  ENT/Pulmonary:  - neg pulmonary ROS     Neurologic:  - neg neurologic ROS     Cardiovascular:     (+) Dyslipidemia ----- (-) murmur   METS/Exercise Tolerance: >4 METS    Hematologic:  - neg hematologic  ROS     Musculoskeletal:  - neg musculoskeletal ROS     GI/Hepatic:     (+) appendicitis (acute appendicitis in March),     Renal/Genitourinary:  - neg Renal ROS     Endo:  - neg endo ROS     Psychiatric/Substance Use:  - neg psychiatric ROS     Infectious Disease:  - neg infectious disease ROS     Malignancy:  - neg malignancy ROS     Other:  - neg other ROS          Physical Exam    Airway        Mallampati: II   TM distance: > 3 FB   Neck ROM: full   Mouth opening: > 3 cm    Respiratory Devices and Support         Dental  no notable dental history         Cardiovascular          Rhythm and rate: regular and normal (-) no murmur    Pulmonary   pulmonary exam normal        breath sounds clear to auscultation           OUTSIDE LABS:  CBC:   Lab Results   Component Value Date    WBC 8.5 2022    WBC 7.6 2022    HGB 15.1 2022    HGB 13.6 2022    HCT 45.9 2022    HCT 41.5 2022     2022      03/19/2022     BMP:   Lab Results   Component Value Date     05/04/2022     03/19/2022    POTASSIUM 4.1 05/04/2022    POTASSIUM 3.7 03/19/2022    CHLORIDE 106 05/04/2022    CHLORIDE 107 03/19/2022    CO2 22 05/04/2022    CO2 24 03/19/2022    BUN 24 (H) 05/04/2022    BUN 13 03/19/2022    CR 1.12 05/04/2022    CR 1.27 (H) 03/19/2022     (H) 05/04/2022     (H) 03/19/2022     COAGS: No results found for: PTT, INR, FIBR  POC: No results found for: BGM, HCG, HCGS  HEPATIC:   Lab Results   Component Value Date    ALBUMIN 3.2 (L) 03/19/2022    PROTTOTAL 6.9 03/19/2022    ALT 36 03/19/2022    AST 18 03/19/2022    ALKPHOS 38 (L) 03/19/2022    BILITOTAL 0.7 03/19/2022     OTHER:   Lab Results   Component Value Date    CHERRY 10.1 05/04/2022    MAG 2.0 03/18/2022    TSH 0.96 06/25/2019       Anesthesia Plan    ASA Status:  2   NPO Status:  NPO Appropriate    Anesthesia Type: General.     - Airway: ETT   Induction: Intravenous, Propofol.   Maintenance: Balanced.        Consents    Anesthesia Plan(s) and associated risks, benefits, and realistic alternatives discussed. Questions answered and patient/representative(s) expressed understanding.    - Discussed:     - Discussed with:  Patient      - Specific Concerns: sore throat, post op pain/nausea, dental damage, rare major complications.     - Extended Intubation/Ventilatory Support Discussed: No.      - Patient is DNR/DNI Status: No    Use of blood products discussed: No .     Postoperative Care    Pain management: IV analgesics, Oral pain medications, Multi-modal analgesia.   PONV prophylaxis: Ondansetron (or other 5HT-3), Dexamethasone or Solumedrol, Background Propofol Infusion     Comments:           H&P reviewed: Unable to attach H&P to encounter due to EHR limitations. H&P Update: appropriate H&P reviewed, patient examined. No interval changes since H&P (within 30 days).         Rizwan Thurston MD

## 2022-05-09 NOTE — ANESTHESIA CARE TRANSFER NOTE
Patient: Erich Pierce    Procedure: Procedure(s):  APPENDECTOMY, ROBOT-ASSISTED       Diagnosis: Grumbling appendix [K36]  Diagnosis Additional Information: No value filed.    Anesthesia Type:   General     Note:    Oropharynx: oropharynx clear of all foreign objects, spontaneously breathing and oral airway in place  Level of Consciousness: drowsy  Oxygen Supplementation: face mask  Level of Supplemental Oxygen (L/min / FiO2): 6  Independent Airway: airway patency satisfactory and stable  Dentition: dentition unchanged  Vital Signs Stable: post-procedure vital signs reviewed and stable  Report to RN Given: handoff report given  Patient transferred to: PACU    Handoff Report: Identifed the Patient, Identified the Reponsible Provider, Reviewed the pertinent medical history, Discussed the surgical course, Reviewed Intra-OP anesthesia mangement and issues during anesthesia, Set expectations for post-procedure period and Allowed opportunity for questions and acknowledgement of understanding      Vitals:  Vitals Value Taken Time   BP     Temp     Pulse     Resp     SpO2         Electronically Signed By: AC Srivastava CRNA  May 9, 2022  9:25 AM

## 2022-05-09 NOTE — ANESTHESIA POSTPROCEDURE EVALUATION
Patient: Erich Pierce    Procedure: Procedure(s):  APPENDECTOMY, ROBOT-ASSISTED       Anesthesia Type:  General    Note:  Disposition: Outpatient   Postop Pain Control: Uneventful            Sign Out: Well controlled pain   PONV: No   Neuro/Psych: Uneventful            Sign Out: Acceptable/Baseline neuro status   Airway/Respiratory: Uneventful            Sign Out: Acceptable/Baseline resp. status   CV/Hemodynamics: Uneventful            Sign Out: Acceptable CV status; No obvious hypovolemia; No obvious fluid overload   Other NRE: NONE   DID A NON-ROUTINE EVENT OCCUR? No           Last vitals:  Vitals Value Taken Time   /94 05/09/22 0945   Temp 35.8  C (96.4  F) 05/09/22 0945   Pulse 64 05/09/22 0948   Resp 11 05/09/22 0948   SpO2 95 % 05/09/22 0948   Vitals shown include unvalidated device data.    Electronically Signed By: Rizwan Thurston MD  May 9, 2022  1:10 PM

## 2022-05-09 NOTE — OP NOTE
Procedure Date: 05/09/2022    PREOPERATIVE DIAGNOSIS:  History of appendicitis, grumbling appendix.    POSTOPERATIVE DIAGNOSIS:  History of appendicitis, grumbling appendix.    OPERATIVE PROCEDURE:  Laparoscopic appendectomy, robot-assisted.    SURGEON:  Gerardo Sidhu MD    ANESTHESIA:  General endotracheal.    INDICATIONS FOR PROCEDURE:  The patient presents after a perforated appendicitis was treated conservatively and the patient continues with symptoms, and as such, a grumbling appendix diagnosis was made and a recommendation was made for laparoscopic interval appendectomy.  Informed consent was obtained.    OPERATIVE FINDINGS:  Adhesions about the appendix, some distention noted, resected and sent to pathology for permanent section.    DESCRIPTION OF PROCEDURE:  The patient was brought to the operating room, put under general anesthesia.  Abdomen widely prepped and draped in the usual sterile fashion.  Left subcostal skin incision was made.  Veress needle introduced, abdomen was insufflated, 8 port was then placed left upper quadrant.  No injury from needle or trocar.  Another 8 port was placed in the mid quadrant laterally and a third port was placed suprapubic under direct vision.  Once these ports were placed, the robot was docked.  Attention turned to the console, where the appendix was pulled up and adhesions taken down using sharp dissection and electrocautery.  A fenestrated bipolar was used to gain hemostasis at the small appendiceal artery.  The mesoappendix was also resected, the base secured circumferentially and 2-0 Vicryl was used to secure the base at 2 sites.  The appendix was thus transected and set aside.  A Z stitch of 2-0 Vicryl was then used to invert the stump of the appendix into the cecum.  Once this was completed, an EndoCatch bag that was placed through an 8 port, and under direct vision the appendix was placed in the EndoCatch bag and removed without difficulty.  At this point, the  robot was undocked, ports were removed.  Skin closed with subcuticular, Steri-Strips were applied.  Estimated blood loss was minimal.  The patient tolerated the procedure well and was taken to recovery room, where he was without difficulty or apparent complication.    Gerardo Sidhu MD        D: 2022   T: 2022   MT: axel    Name:     LU MALAVE  MRN:      [id number]        Account:        [id number]   :      1980           Procedure Date: 2022     Document: [id number]

## 2022-05-09 NOTE — BRIEF OP NOTE
[facility] Brief Operative Note    Pre-operative diagnosis: Grumbling appendix [K36]   Post-operative diagnosis Same as Pre-op Dx   Procedure: Procedure(s):  APPENDECTOMY, ROBOT-ASSISTED   Surgeon: Gerardo Sidhu MD   Assistants(s):    Estimated blood loss: 1 mL    Specimens: appy   Findings: yes

## 2022-05-11 ENCOUNTER — PATIENT OUTREACH (OUTPATIENT)
Dept: SURGERY | Facility: CLINIC | Age: 42
End: 2022-05-11
Payer: COMMERCIAL

## 2022-05-11 NOTE — PROGRESS NOTES
RN Post-Op/Post-Discharge Care Coordination Note    Mr. Erich Pierce is a 41 year old male who underwent robotic appendectomy on 5/9 with Dr. Gerardo Sidhu.  Spoke with Patient.    Support  Patient able to care for self independently     Health Status  Nausea/Vomiting: Patient denies nausea/vomiting.  Eating/drinking: Patient is able to eat and drink without any complaints.  Bowel habits: Patient reports having a normal bowel movement.  Drains (ALVARO): N/A  Fevers/chills: Patient denies any fever or chills.  Incisions: Patient denies any signs and symptoms of infection.  Wound closure:  Steri-strips  Pain: tolerable, using Tylenol and ibuprofen per package instructions. Not using Norco.  New Medications: Norco, Senna    Activity/Restrictions  No lifting in excess of 15-20 pounds for 3-4 weeks    Equipment  None    Pathology reviewed with patient:  No, not yet available    Forms/Letters  No    All of his questions were answered including reviewing restrictions and wound care.  He will call this office if he has any further questions and/or concerns.      In lieu of a post-op clinic patient that patient would like to be contacted in approximately 7-10 days via telephone.    A copy of this note was routed to the primary surgeon.      Whom and When to Call  Patient acknowledges understanding of how to manage any medication changes and when to seek medical care.     Patient advised that if after hour medical concerns arise to please call [phone] and choose option 4 to speak to the physician on call.

## 2022-05-12 LAB
PATH REPORT.COMMENTS IMP SPEC: NORMAL
PATH REPORT.COMMENTS IMP SPEC: NORMAL
PATH REPORT.FINAL DX SPEC: NORMAL
PATH REPORT.GROSS SPEC: NORMAL
PATH REPORT.MICROSCOPIC SPEC OTHER STN: NORMAL
PATH REPORT.RELEVANT HX SPEC: NORMAL
PHOTO IMAGE: NORMAL

## 2022-05-18 ENCOUNTER — PATIENT OUTREACH (OUTPATIENT)
Dept: SURGERY | Facility: CLINIC | Age: 42
End: 2022-05-18
Payer: COMMERCIAL

## 2022-05-18 NOTE — PROGRESS NOTES
Erich Pierce was contacted several days post procedure via telephone for a status update and elected to have a telephone follow-up call approximately 7-10 days after original contact in lieu of a clinic visit with Dr. Gerardo Sidhu. He continues to do well and the steri-strips  have not peeled off.  The patients wounds are healed and the area is C/D/I.  He is afebrile, pain free, and castro po; the patient is slowly resuming his normal activity. Discussed restrictions including no lifting in excess of 15 pounds for 2 more weeks.    Pathology was reviewed with the patient: Yes  Final Diagnosis   A. APPENDIX, APPENDECTOMY:  - Acute appendicitis     All of Erich Pierce question's were answered and  he would like to return to the clinic on a PRN basis.  The patient is aware that  he may schedule a post op appointment at any time.    A copy of this note was routed to the patients surgeon.

## 2022-05-26 ENCOUNTER — OFFICE VISIT (OUTPATIENT)
Dept: INTERNAL MEDICINE | Facility: CLINIC | Age: 42
End: 2022-05-26
Payer: COMMERCIAL

## 2022-05-26 VITALS
HEIGHT: 66 IN | SYSTOLIC BLOOD PRESSURE: 118 MMHG | BODY MASS INDEX: 29.41 KG/M2 | WEIGHT: 183 LBS | OXYGEN SATURATION: 98 % | DIASTOLIC BLOOD PRESSURE: 82 MMHG | HEART RATE: 68 BPM

## 2022-05-26 DIAGNOSIS — R80.9 PROTEINURIA, UNSPECIFIED TYPE: Primary | ICD-10-CM

## 2022-05-26 DIAGNOSIS — Z84.1 FAMILY HISTORY OF RENAL FAILURE: ICD-10-CM

## 2022-05-26 PROBLEM — K35.80 ACUTE APPENDICITIS, UNSPECIFIED ACUTE APPENDICITIS TYPE: Status: RESOLVED | Noted: 2022-03-18 | Resolved: 2022-05-26

## 2022-05-26 PROBLEM — R10.9 ACUTE ABDOMINAL PAIN: Status: RESOLVED | Noted: 2022-03-18 | Resolved: 2022-05-26

## 2022-05-26 PROBLEM — K36: Status: RESOLVED | Noted: 2022-03-28 | Resolved: 2022-05-26

## 2022-05-26 LAB
ALBUMIN UR-MCNC: 100 MG/DL
ANION GAP SERPL CALCULATED.3IONS-SCNC: 9 MMOL/L (ref 5–18)
APPEARANCE UR: CLEAR
BACTERIA #/AREA URNS HPF: ABNORMAL /HPF
BILIRUB UR QL STRIP: NEGATIVE
BUN SERPL-MCNC: 25 MG/DL (ref 8–22)
CALCIUM SERPL-MCNC: 9.7 MG/DL (ref 8.5–10.5)
CHLORIDE BLD-SCNC: 106 MMOL/L (ref 98–107)
CO2 SERPL-SCNC: 27 MMOL/L (ref 22–31)
COLOR UR AUTO: YELLOW
CREAT SERPL-MCNC: 1.16 MG/DL (ref 0.7–1.3)
CREAT UR-MCNC: 194 MG/DL
GFR SERPL CREATININE-BSD FRML MDRD: 81 ML/MIN/1.73M2
GLUCOSE BLD-MCNC: 105 MG/DL (ref 70–125)
GLUCOSE UR STRIP-MCNC: NEGATIVE MG/DL
HGB UR QL STRIP: ABNORMAL
KETONES UR STRIP-MCNC: NEGATIVE MG/DL
LEUKOCYTE ESTERASE UR QL STRIP: NEGATIVE
MICROALBUMIN UR-MCNC: 33.49 MG/DL (ref 0–1.99)
MICROALBUMIN/CREAT UR: 172.6 MG/G CR
NITRATE UR QL: NEGATIVE
PH UR STRIP: 5.5 [PH] (ref 5–8)
POTASSIUM BLD-SCNC: 3.8 MMOL/L (ref 3.5–5)
RBC #/AREA URNS AUTO: ABNORMAL /HPF
SODIUM SERPL-SCNC: 142 MMOL/L (ref 136–145)
SP GR UR STRIP: >=1.03 (ref 1–1.03)
SQUAMOUS #/AREA URNS AUTO: ABNORMAL /LPF
UROBILINOGEN UR STRIP-ACNC: 0.2 E.U./DL
WBC #/AREA URNS AUTO: ABNORMAL /HPF

## 2022-05-26 PROCEDURE — 80048 BASIC METABOLIC PNL TOTAL CA: CPT | Performed by: INTERNAL MEDICINE

## 2022-05-26 PROCEDURE — 99214 OFFICE O/P EST MOD 30 MIN: CPT | Performed by: INTERNAL MEDICINE

## 2022-05-26 PROCEDURE — 36415 COLL VENOUS BLD VENIPUNCTURE: CPT | Performed by: INTERNAL MEDICINE

## 2022-05-26 PROCEDURE — 81001 URINALYSIS AUTO W/SCOPE: CPT | Performed by: INTERNAL MEDICINE

## 2022-05-26 PROCEDURE — 82043 UR ALBUMIN QUANTITATIVE: CPT | Performed by: INTERNAL MEDICINE

## 2022-05-26 PROCEDURE — 84166 PROTEIN E-PHORESIS/URINE/CSF: CPT | Performed by: INTERNAL MEDICINE

## 2022-05-26 NOTE — PROGRESS NOTES
Office Visit - Follow Up   Erich Pierce   41 year old male    Date of Visit: 2022    Chief Complaint   Patient presents with     RECHECK     Post op & recent lab results follow up         Assessment and Plan   1. Proteinuria, unspecified type  I think the proteinuria and abnormality seen on urinalysis were likely due to the inflammation in the pelvis area from his acute appendicitis.  However we will recheck urinalysis today.  Labs as below to evaluate for other protein components.  - UA Macro with Reflex to Micro and Culture - lab collect; Future  - Albumin Random Urine Quantitative with Creat Ratio; Future  - Protein electrophoresis random urine; Future  - Basic metabolic panel  (Ca, Cl, CO2, Creat, Gluc, K, Na, BUN); Future    2. Family history of renal failure  It would be nice to get some information regarding this but it does not appear he has anything like PC Kd.        Return in about 2 months (around 2022) for Follow up, with me, in person.     History of Present Illness   This 41 year old Tye comes today for her follow-up of his recent appendectomy.  Preoperatively he had some proteinuria.  He had also had some microscopic hematuria this was all around the time of his acute appendicitis however.  Its unclear how much the inflammation in that area was causing his symptoms.  He tells me his urine is normal but he is concerned because his father  at an early age in his 50s of renal failure.  No known inherited kidney diseases.  He takes a rare ibuprofen but not for many weeks.  Kidneys appeared normal on a CT scan done 2 months ago.    Review of Systems: A comprehensive review of systems was negative except as noted.     Medications, Allergies and Problem List   Reviewed, reconciled and updated  Post Discharge Medication Reconciliation Status:   Social History     Social History Narrative     Not on file        Physical Exam   General Appearance:   Pleasant gentleman who looks well.  No  "distress.  His appendectomy site incisions are healing nicely    /82 (BP Location: Left arm, Patient Position: Sitting, Cuff Size: Adult Regular)   Pulse 68   Ht 1.676 m (5' 6\")   Wt 83 kg (183 lb)   SpO2 98%   BMI 29.54 kg/m           Additional Information   Current Outpatient Medications   Medication Sig Dispense Refill     cetirizine (ZYRTEC) 10 MG tablet Take 10 mg by mouth daily       cholecalciferol, vitamin D3, 125 mcg (5,000 unit) capsule [CHOLECALCIFEROL, VITAMIN D3, 125 MCG (5,000 UNIT) CAPSULE] Take 5,000 Units by mouth daily.       fenofibrate micronized (LOFIBRA) 200 MG capsule [FENOFIBRATE MICRONIZED (LOFIBRA) 200 MG CAPSULE] Take 1 capsule (200 mg total) by mouth daily before breakfast. 90 capsule 3     loratadine (CLARITIN) 10 MG tablet Take 10 mg by mouth daily       Multiple Vitamin (MULTIVITAMIN ADULT PO) Take 1 tablet by mouth daily       rosuvastatin (CRESTOR) 10 MG tablet [ROSUVASTATIN (CRESTOR) 10 MG TABLET] Take 1 tablet (10 mg total) by mouth at bedtime. 90 tablet 3     No Known Allergies  Social History     Tobacco Use     Smoking status: Never Smoker     Smokeless tobacco: Never Used       Review and/or order of clinical lab tests:  Review and/or order of radiology tests:  Review and/or order of medicine tests:  Discussion of test results with performing physician:  Decision to obtain old records and/or obtain history from someone other than the patient:  Review and summarization of old records and/or obtaining history from someone other than the patient and.or discussion of case with another health care provider:  Independent visualization of image, tracing or specimen itself:    Time:      JOSE LARA MD  "

## 2022-05-26 NOTE — LETTER
June 2, 2022      yTe Pierce  [address] [address]        Dear ,    We are writing to inform you of your test results.    Tye, you do not have any abnormal proteins in the urine, which is good news.  However you do have more protein in the urine than I would expect for someone your age and in good health.  With your father's history, I want you to meet with a kidney specialist.  I also have sent a prescription to your pharmacy for lisinopril, which is a blood pressure medication which protects the kidney.  Side effects include cough and lightheadedness/dizziness.  I started you on a very low dose.  You will need a blood pressure check and some lab work in a week or 2.  You can do that closer to home.        Resulted Orders   UA Macro with Reflex to Micro and Culture - lab collect   Result Value Ref Range    Color Urine Yellow Colorless, Straw, Light Yellow, Yellow    Appearance Urine Clear Clear    Glucose Urine Negative Negative mg/dL    Bilirubin Urine Negative Negative    Ketones Urine Negative Negative mg/dL    Specific Gravity Urine >=1.030 1.005 - 1.030    Blood Urine Trace (A) Negative    pH Urine 5.5 5.0 - 8.0    Protein Albumin Urine 100  (A) Negative mg/dL    Urobilinogen Urine 0.2 0.2, 1.0 E.U./dL    Nitrite Urine Negative Negative    Leukocyte Esterase Urine Negative Negative   Albumin Random Urine Quantitative with Creat Ratio   Result Value Ref Range    Microalbumin Urine mg/dL 33.49 (H) 0.00 - 1.99 mg/dL    Creatinine Urine mg/dL 194 mg/dL    Microalbumin Urine mg/g Cr 172.6 (H) <=19.9 mg/g Cr    Narrative    Microalbumin, Random Urine   <2.0 mg/dL . . . . . . . . Normal   3.0-30.0 mg/dL . . . . . . Microalbuminuria   >30.0 mg/dL . . . . . .  . Clinical Proteinuria     Microalbumin/Creatinine Ratio, Random Urine   <20 mg/g . . . . .. . . . Normal    mg/g . . . . . . . Microalbuminuria   >300 mg/g . . . . . . . . Clinical Proteinuria   Protein electrophoresis  random urine   Result Value Ref Range    Total Protein Random Urine 46 mg/dL    ELP Interpretation Urine No abnormal protein component identified.       Path ICD: R80.9     Reviewing Pathologist Obdulio Benjamin MD     Basic metabolic panel  (Ca, Cl, CO2, Creat, Gluc, K, Na, BUN)   Result Value Ref Range    Sodium 142 136 - 145 mmol/L    Potassium 3.8 3.5 - 5.0 mmol/L    Chloride 106 98 - 107 mmol/L    Carbon Dioxide (CO2) 27 22 - 31 mmol/L    Anion Gap 9 5 - 18 mmol/L    Urea Nitrogen 25 (H) 8 - 22 mg/dL    Creatinine 1.16 0.70 - 1.30 mg/dL    Calcium 9.7 8.5 - 10.5 mg/dL    Glucose 105 70 - 125 mg/dL    GFR Estimate 81 >60 mL/min/1.73m2      Comment:      Effective December 21, 2021 eGFRcr in adults is calculated using the 2021 CKD-EPI creatinine equation which includes age and gender (Varghese et al., NEJM, DOI: 10.1056/KPWYbd0423242)   Urine Microscopic   Result Value Ref Range    Bacteria Urine None Seen None Seen /HPF    RBC Urine 0-2 0-2 /HPF /HPF    WBC Urine 0-5 0-5 /HPF /HPF    Squamous Epithelials Urine Few (A) None Seen /LPF    Narrative    Urine Culture not indicated       If you have any questions or concerns, please call the clinic at the number listed above.       Sincerely,      Damaso Harris MD

## 2022-05-31 LAB
PATH ICD:: NORMAL
PROT PATTERN UR ELPH-IMP: NORMAL
REVIEWING PATHOLOGIST: NORMAL
TOTAL PROTEIN RANDOM URINE MG/DL: 46 MG/DL

## 2022-06-01 RX ORDER — LISINOPRIL 5 MG/1
5 TABLET ORAL DAILY
Qty: 90 TABLET | Refills: 3 | Status: SHIPPED | OUTPATIENT
Start: 2022-06-01 | End: 2023-02-13

## 2022-06-16 ENCOUNTER — LAB (OUTPATIENT)
Dept: LAB | Facility: CLINIC | Age: 42
End: 2022-06-16
Payer: COMMERCIAL

## 2022-06-16 DIAGNOSIS — R80.9 PROTEINURIA, UNSPECIFIED TYPE: ICD-10-CM

## 2022-06-16 PROCEDURE — 36415 COLL VENOUS BLD VENIPUNCTURE: CPT

## 2022-06-16 PROCEDURE — 80048 BASIC METABOLIC PNL TOTAL CA: CPT

## 2022-06-17 ENCOUNTER — TELEPHONE (OUTPATIENT)
Dept: INTERNAL MEDICINE | Facility: CLINIC | Age: 42
End: 2022-06-17
Payer: COMMERCIAL

## 2022-06-17 LAB
ANION GAP SERPL CALCULATED.3IONS-SCNC: 6 MMOL/L (ref 3–14)
BUN SERPL-MCNC: 20 MG/DL (ref 7–30)
CALCIUM SERPL-MCNC: 9 MG/DL (ref 8.5–10.1)
CHLORIDE BLD-SCNC: 108 MMOL/L (ref 94–109)
CO2 SERPL-SCNC: 26 MMOL/L (ref 20–32)
CREAT SERPL-MCNC: 1.07 MG/DL (ref 0.66–1.25)
GFR SERPL CREATININE-BSD FRML MDRD: 89 ML/MIN/1.73M2
GLUCOSE BLD-MCNC: 126 MG/DL (ref 70–99)
POTASSIUM BLD-SCNC: 3.7 MMOL/L (ref 3.4–5.3)
SODIUM SERPL-SCNC: 140 MMOL/L (ref 133–144)

## 2022-07-21 ENCOUNTER — OFFICE VISIT (OUTPATIENT)
Dept: INTERNAL MEDICINE | Facility: CLINIC | Age: 42
End: 2022-07-21
Payer: COMMERCIAL

## 2022-07-21 VITALS
SYSTOLIC BLOOD PRESSURE: 118 MMHG | DIASTOLIC BLOOD PRESSURE: 68 MMHG | WEIGHT: 184 LBS | HEIGHT: 66 IN | TEMPERATURE: 97.1 F | BODY MASS INDEX: 29.57 KG/M2 | OXYGEN SATURATION: 98 % | HEART RATE: 66 BPM

## 2022-07-21 DIAGNOSIS — Z84.1 FAMILY HISTORY OF RENAL FAILURE: ICD-10-CM

## 2022-07-21 DIAGNOSIS — R80.9 PROTEINURIA, UNSPECIFIED TYPE: ICD-10-CM

## 2022-07-21 DIAGNOSIS — R73.9 HYPERGLYCEMIA: ICD-10-CM

## 2022-07-21 DIAGNOSIS — Z00.00 ROUTINE GENERAL MEDICAL EXAMINATION AT A HEALTH CARE FACILITY: Primary | ICD-10-CM

## 2022-07-21 DIAGNOSIS — E78.1 PURE HYPERGLYCERIDEMIA: ICD-10-CM

## 2022-07-21 DIAGNOSIS — E78.2 MIXED HYPERLIPIDEMIA: ICD-10-CM

## 2022-07-21 LAB
ALBUMIN SERPL BCG-MCNC: 5 G/DL (ref 3.5–5.2)
ALBUMIN UR-MCNC: 30 MG/DL
ALP SERPL-CCNC: 38 U/L (ref 40–129)
ALT SERPL W P-5'-P-CCNC: 46 U/L (ref 10–50)
ANION GAP SERPL CALCULATED.3IONS-SCNC: 12 MMOL/L (ref 7–15)
APPEARANCE UR: CLEAR
AST SERPL W P-5'-P-CCNC: 32 U/L (ref 10–50)
BACTERIA #/AREA URNS HPF: ABNORMAL /HPF
BILIRUB SERPL-MCNC: 0.6 MG/DL
BILIRUB UR QL STRIP: NEGATIVE
BUN SERPL-MCNC: 22.6 MG/DL (ref 6–20)
CALCIUM SERPL-MCNC: 9.9 MG/DL (ref 8.6–10)
CHLORIDE SERPL-SCNC: 104 MMOL/L (ref 98–107)
CHOLEST SERPL-MCNC: 179 MG/DL
COLOR UR AUTO: YELLOW
CREAT SERPL-MCNC: 1.19 MG/DL (ref 0.67–1.17)
DEPRECATED HCO3 PLAS-SCNC: 24 MMOL/L (ref 22–29)
GFR SERPL CREATININE-BSD FRML MDRD: 78 ML/MIN/1.73M2
GLUCOSE SERPL-MCNC: 105 MG/DL (ref 70–99)
GLUCOSE UR STRIP-MCNC: NEGATIVE MG/DL
HBA1C MFR BLD: 6 % (ref 0–5.6)
HDLC SERPL-MCNC: 50 MG/DL
HGB UR QL STRIP: ABNORMAL
KETONES UR STRIP-MCNC: NEGATIVE MG/DL
LDLC SERPL CALC-MCNC: 99 MG/DL
LEUKOCYTE ESTERASE UR QL STRIP: NEGATIVE
MUCOUS THREADS #/AREA URNS LPF: PRESENT /LPF
NITRATE UR QL: NEGATIVE
NONHDLC SERPL-MCNC: 129 MG/DL
PH UR STRIP: 5 [PH] (ref 5–8)
POTASSIUM SERPL-SCNC: 4.7 MMOL/L (ref 3.4–5.3)
PROT SERPL-MCNC: 7.7 G/DL (ref 6.4–8.3)
RBC #/AREA URNS AUTO: ABNORMAL /HPF
SODIUM SERPL-SCNC: 140 MMOL/L (ref 136–145)
SP GR UR STRIP: >=1.03 (ref 1–1.03)
SQUAMOUS #/AREA URNS AUTO: ABNORMAL /LPF
TRIGL SERPL-MCNC: 150 MG/DL
UROBILINOGEN UR STRIP-ACNC: 0.2 E.U./DL
WBC #/AREA URNS AUTO: ABNORMAL /HPF

## 2022-07-21 PROCEDURE — 80061 LIPID PANEL: CPT | Performed by: INTERNAL MEDICINE

## 2022-07-21 PROCEDURE — 80053 COMPREHEN METABOLIC PANEL: CPT | Performed by: INTERNAL MEDICINE

## 2022-07-21 PROCEDURE — 81001 URINALYSIS AUTO W/SCOPE: CPT | Performed by: INTERNAL MEDICINE

## 2022-07-21 PROCEDURE — 36415 COLL VENOUS BLD VENIPUNCTURE: CPT | Performed by: INTERNAL MEDICINE

## 2022-07-21 PROCEDURE — 99214 OFFICE O/P EST MOD 30 MIN: CPT | Mod: 25 | Performed by: INTERNAL MEDICINE

## 2022-07-21 PROCEDURE — 83036 HEMOGLOBIN GLYCOSYLATED A1C: CPT | Performed by: INTERNAL MEDICINE

## 2022-07-21 PROCEDURE — 82043 UR ALBUMIN QUANTITATIVE: CPT | Performed by: INTERNAL MEDICINE

## 2022-07-21 PROCEDURE — 99396 PREV VISIT EST AGE 40-64: CPT | Performed by: INTERNAL MEDICINE

## 2022-07-21 ASSESSMENT — ENCOUNTER SYMPTOMS
WEAKNESS: 0
HEMATURIA: 0
MYALGIAS: 0
HEADACHES: 0
CHILLS: 0
HEARTBURN: 0
PARESTHESIAS: 0
JOINT SWELLING: 0
PALPITATIONS: 0
ABDOMINAL PAIN: 0
SORE THROAT: 0
FEVER: 0
FREQUENCY: 0
NAUSEA: 0
CONSTIPATION: 0
COUGH: 0
HEMATOCHEZIA: 0
NERVOUS/ANXIOUS: 0
DIZZINESS: 0
ARTHRALGIAS: 0
DYSURIA: 0
EYE PAIN: 0
DIARRHEA: 0
SHORTNESS OF BREATH: 0

## 2022-07-21 NOTE — LETTER
July 26, 2022      Tye Pierce  [address] [place] [address]        Dear ,    We are writing to inform you of your test results.    Tye, you are still spilling the protein in the urine, but it may have cut back a little with the medication.  Cholesterol looks good.  Unfortunately, your blood sugar is in the prediabetic range.  I do not think this would cause the protein in the urine.  It is concerning however, because I do not want you to progress to diabetes.  At least 150 minutes of aerobic exercise weekly as well as a lower carbohydrate Mediterranean type diet, and potentially some intermittent fasting would be recommended for slowing progression to diabetes.  I would like to see you back in 6 months and recheck these labs at that time.       Resulted Orders   Hemoglobin A1c   Result Value Ref Range    Hemoglobin A1C 6.0 (H) 0.0 - 5.6 %      Comment:      Normal <5.7%   Prediabetes 5.7-6.4%    Diabetes 6.5% or higher     Note: Adopted from ADA consensus guidelines.   Lipid panel reflex to direct LDL Fasting   Result Value Ref Range    Cholesterol 179 <200 mg/dL    Triglycerides 150 (H) <150 mg/dL    Direct Measure HDL 50 >=40 mg/dL    LDL Cholesterol Calculated 99 <=100 mg/dL    Non HDL Cholesterol 129 <130 mg/dL    Narrative    Cholesterol  Desirable:  <200 mg/dL    Triglycerides  Normal:  Less than 150 mg/dL  Borderline High:  150-199 mg/dL  High:  200-499 mg/dL  Very High:  Greater than or equal to 500 mg/dL    Direct Measure HDL  Female:  Greater than or equal to 50 mg/dL   Male:  Greater than or equal to 40 mg/dL    LDL Cholesterol  Desirable:  <100mg/dL  Above Desirable:  100-129 mg/dL   Borderline High:  130-159 mg/dL   High:  160-189 mg/dL   Very High:  >= 190 mg/dL    Non HDL Cholesterol  Desirable:  130 mg/dL  Above Desirable:  130-159 mg/dL  Borderline High:  160-189 mg/dL  High:  190-219 mg/dL  Very High:  Greater than or equal to 220 mg/dL   Comprehensive metabolic panel (BMP  + Alb, Alk Phos, ALT, AST, Total. Bili, TP)   Result Value Ref Range    Sodium 140 136 - 145 mmol/L    Potassium 4.7 3.4 - 5.3 mmol/L    Creatinine 1.19 (H) 0.67 - 1.17 mg/dL    Urea Nitrogen 22.6 (H) 6.0 - 20.0 mg/dL    Chloride 104 98 - 107 mmol/L    Carbon Dioxide (CO2) 24 22 - 29 mmol/L    Anion Gap 12 7 - 15 mmol/L    Glucose 105 (H) 70 - 99 mg/dL    Calcium 9.9 8.6 - 10.0 mg/dL    Protein Total 7.7 6.4 - 8.3 g/dL    Albumin 5.0 3.5 - 5.2 g/dL    Bilirubin Total 0.6 <=1.2 mg/dL    Alkaline Phosphatase 38 (L) 40 - 129 U/L    AST 32 10 - 50 U/L    ALT 46 10 - 50 U/L    GFR Estimate 78 >60 mL/min/1.73m2      Comment:      Effective December 21, 2021 eGFRcr in adults is calculated using the 2021 CKD-EPI creatinine equation which includes age and gender (Varghese et al., NEJ, DOI: 10.1056/JXMZic6734657)   UA Macro with Reflex to Micro and Culture - lab collect   Result Value Ref Range    Color Urine Yellow Colorless, Straw, Light Yellow, Yellow    Appearance Urine Clear Clear    Glucose Urine Negative Negative mg/dL    Bilirubin Urine Negative Negative    Ketones Urine Negative Negative mg/dL    Specific Gravity Urine >=1.030 1.005 - 1.030    Blood Urine Trace (A) Negative    pH Urine 5.0 5.0 - 8.0    Protein Albumin Urine 30  (A) Negative mg/dL    Urobilinogen Urine 0.2 0.2, 1.0 E.U./dL    Nitrite Urine Negative Negative    Leukocyte Esterase Urine Negative Negative   Albumin Random Urine Quantitative with Creat Ratio   Result Value Ref Range    Albumin Urine mg/L 174.0 mg/L      Comment:      The reference ranges have not been established in urine albumin. The results should be integrated into the clinical context for interpretation.    Albumin Urine mg/g Cr 125.18 (H) 0.00 - 17.00 mg/g Cr      Comment:      Microalbuminuria is defined as an albumin:creatinine ratio of 17 to 299 for males and 25 to 299 for females. A ratio of albumin:creatinine of 300 or higher is indicative of overt proteinuria.  Due to biologic  variability, positive results should be confirmed by a second, first-morning random or 24-hour timed urine specimen. If there is discrepancy, a third specimen is recommended. When 2 out of 3 results are in the microalbuminuria range, this is evidence for incipient nephropathy and warrants increased efforts at glucose control, blood pressure control, and institution of therapy with an angiotensin-converting-enzyme (ACE) inhibitor (if the patient can tolerate it).      Creatinine Urine mg/dL 139.0 mg/dL      Comment:      The reference ranges have not been established in urine creatinine. The results should be integrated into the clinical context for interpretation.   Urine Microscopic   Result Value Ref Range    Bacteria Urine None Seen None Seen /HPF    RBC Urine 0-2 0-2 /HPF /HPF    WBC Urine 0-5 0-5 /HPF /HPF    Squamous Epithelials Urine Few (A) None Seen /LPF    Mucus Urine Present (A) None Seen /LPF    Narrative    Urine Culture not indicated       If you have any questions or concerns, please call the clinic at the number listed above.       Sincerely,      Damaso Harris MD

## 2022-07-21 NOTE — PROGRESS NOTES
SUBJECTIVE:   CC: Eirch Pierce is an 41 year old male who presents for preventative health visit.     Tye comes in a for physical.  He ports has been doing well.  He was found to have proteinuria of unclear etiology.  He is set up to meet with a nephrologist but not for a few weeks or months actually.  He does have a family history of kidney failure in his father.  He has no known obvious cause of the proteinuria.  He is now tolerating low-dose lisinopril without difficulty.  He otherwise denies somatic concerns for me.  Patient has been advised of split billing requirements and indicates understanding: Yes  Healthy Habits:     Getting at least 3 servings of Calcium per day:  Yes    Bi-annual eye exam:  Yes    Dental care twice a year:  Yes    Sleep apnea or symptoms of sleep apnea:  None    Diet:  Regular (no restrictions)    Frequency of exercise:  None    Taking medications regularly:  Yes    Medication side effects:  None    PHQ-2 Total Score: 0    Additional concerns today:  No              Today's PHQ-2 Score:   PHQ-2 ( 1999 [employer]) 7/21/2022   Q1: Little interest or pleasure in doing things 0   Q2: Feeling down, depressed or hopeless 0   PHQ-2 Score 0   Q1: Little interest or pleasure in doing things Not at all   Q2: Feeling down, depressed or hopeless Not at all   PHQ-2 Score 0       Abuse: Current or Past(Physical, Sexual or Emotional)- No  Do you feel safe in your environment? Yes    Have you ever done Advance Care Planning? (For example, a Health Directive, POLST, or a discussion with a medical provider or your loved ones about your wishes): Yes, patient states has an Advance Care Planning document and will bring a copy to the clinic.    Social History     Tobacco Use     Smoking status: Never Smoker     Smokeless tobacco: Never Used   Substance Use Topics     Alcohol use: Not on file     If you drink alcohol do you typically have >3 drinks per day or >7 drinks per week? No    Alcohol Use 7/21/2022  "  Prescreen: >3 drinks/day or >7 drinks/week? No       Last PSA: No results found for: PSA    Reviewed orders with patient. Reviewed health maintenance and updated orders accordingly - Yes      Reviewed and updated as needed this visit by clinical staff   Tobacco   Meds                Reviewed and updated as needed this visit by Provider                       Review of Systems   Constitutional: Negative for chills and fever.   HENT: Negative for congestion, ear pain, hearing loss and sore throat.    Eyes: Negative for pain and visual disturbance.   Respiratory: Negative for cough and shortness of breath.    Cardiovascular: Negative for chest pain, palpitations and peripheral edema.   Gastrointestinal: Negative for abdominal pain, constipation, diarrhea, heartburn, hematochezia and nausea.   Genitourinary: Negative for dysuria, frequency, genital sores, hematuria and urgency.   Musculoskeletal: Negative for arthralgias, joint swelling and myalgias.   Skin: Negative for rash.   Neurological: Negative for dizziness, weakness, headaches and paresthesias.   Psychiatric/Behavioral: Negative for mood changes. The patient is not nervous/anxious.          OBJECTIVE:   Ht 1.676 m (5' 6\")   Wt 83.5 kg (184 lb)   BMI 29.70 kg/m      Physical Exam  GENERAL: healthy, alert and no distress  EYES: Eyes grossly normal to inspection, PERRL and conjunctivae and sclerae normal  HENT: normal cephalic/atraumatic and ear canals and TM's normal  NECK: no adenopathy, no asymmetry, masses, or scars and thyroid normal to palpation  RESP: lungs clear to auscultation - no rales, rhonchi or wheezes  CV: regular rate and rhythm, normal S1 S2, no S3 or S4, no murmur, click or rub, no peripheral edema and peripheral pulses strong  ABDOMEN: soft, nontender, no hepatosplenomegaly, no masses and bowel sounds normal  MS: no gross musculoskeletal defects noted, no edema  SKIN: no suspicious lesions or rashes  NEURO: Normal strength and tone, " "mentation intact and speech normal  PSYCH: mentation appears normal, affect normal/bright    Diagnostic Test Results:  Labs reviewed in Epic    ASSESSMENT/PLAN:   1. Routine general medical examination at a health care facility  He lives a pretty active and healthy lifestyle.  Try to stay away from fast food and other unhealthy options.  Difficult to with the kids.  We will see him back yearly.  - Hemoglobin A1c; Future  - Lipid panel reflex to direct LDL Fasting; Future  - Comprehensive metabolic panel (BMP + Alb, Alk Phos, ALT, AST, Total. Bili, TP); Future  - UA Macro with Reflex to Micro and Culture - lab collect; Future  - Albumin Random Urine Quantitative with Creat Ratio; Future    2. Proteinuria, unspecified type  Unclear etiology.  He will be meeting with nephrology.  I will be interested to hear what they have to say.  - Comprehensive metabolic panel (BMP + Alb, Alk Phos, ALT, AST, Total. Bili, TP); Future  - UA Macro with Reflex to Micro and Culture - lab collect; Future  - Albumin Random Urine Quantitative with Creat Ratio; Future    3. Family history of renal failure  As above    4. Pure hyperglyceridemia  Lipids today on his medication.  - Lipid panel reflex to direct LDL Fasting; Future  - Comprehensive metabolic panel (BMP + Alb, Alk Phos, ALT, AST, Total. Bili, TP); Future    5. Hyperglycemia  I do note he has been mildly hyperglycemic.  Will check A1c.  Diabetes of course could cause the proteinuria but he has not had diabetic range sugars to my knowledge.  - Hemoglobin A1c; Future    Patient has been advised of split billing requirements and indicates understanding: Yes    COUNSELING:   Reviewed preventive health counseling, as reflected in patient instructions    Estimated body mass index is 29.7 kg/m  as calculated from the following:    Height as of this encounter: 1.676 m (5' 6\").    Weight as of this encounter: 83.5 kg (184 lb).     Weight management plan: Discussed healthy diet and exercise " guidelines    He reports that he has never smoked. He has never used smokeless tobacco.      Counseling Resources:  ATP IV Guidelines  Pooled Cohorts Equation Calculator  FRAX Risk Assessment  ICSI Preventive Guidelines  Dietary Guidelines for Americans, 2010  USDA's MyPlate  ASA Prophylaxis  Lung CA Screening    JOSE LARA MD  [facility]

## 2022-07-22 LAB
CREAT UR-MCNC: 139 MG/DL
MICROALBUMIN UR-MCNC: 174 MG/L
MICROALBUMIN/CREAT UR: 125.18 MG/G CR (ref 0–17)

## 2022-07-23 RX ORDER — FENOFIBRATE 200 MG/1
CAPSULE ORAL
Qty: 90 CAPSULE | Refills: 3 | Status: SHIPPED | OUTPATIENT
Start: 2022-07-23 | End: 2023-02-13

## 2022-07-23 RX ORDER — ROSUVASTATIN CALCIUM 10 MG/1
TABLET, COATED ORAL
Qty: 90 TABLET | Refills: 3 | Status: SHIPPED | OUTPATIENT
Start: 2022-07-23 | End: 2023-02-13

## 2022-07-23 NOTE — TELEPHONE ENCOUNTER
"Last Written Prescription Date:  7/7/21  Last Fill Quantity: 90,  # refills: 3   Last office visit provider:  7/21/22     Requested Prescriptions   Pending Prescriptions Disp Refills     fenofibrate micronized (LOFIBRA) 200 MG capsule [Pharmacy Med Name: Fenofibrate Micronized Oral Capsule 200 MG] 90 capsule 0     Sig: Take 1 capsule (200 mg total) by mouth daily before breakfast.       Fibrates Passed - 7/21/2022 10:53 PM        Passed - Lipid panel on file in past 12 months     Recent Labs   Lab Test 07/21/22  0947   CHOL 179   TRIG 150*   HDL 50   LDL 99   NHDL 129               Passed - No abnormal creatine kinase in past 12 months     No lab results found.             Passed - Recent (12 mo) or future (30 days) visit within the authorizing provider's specialty     Patient has had an office visit with the authorizing provider or a provider within the authorizing providers department within the previous 12 mos or has a future within next 30 days. See \"Patient Info\" tab in inbasket, or \"Choose Columns\" in Meds & Orders section of the refill encounter.              Passed - Medication is active on med list        Passed - Patient is age 18 or older           rosuvastatin (CRESTOR) 10 MG tablet [Pharmacy Med Name: Rosuvastatin Calcium Oral Tablet 10 MG] 90 tablet 0     Sig: Take 1 tablet (10 mg total) by mouth at bedtime.       Statins Protocol Passed - 7/21/2022 10:53 PM        Passed - LDL on file in past 12 months     Recent Labs   Lab Test 07/21/22  0947   LDL 99             Passed - No abnormal creatine kinase in past 12 months     No lab results found.             Passed - Recent (12 mo) or future (30 days) visit within the authorizing provider's specialty     Patient has had an office visit with the authorizing provider or a provider within the authorizing providers department within the previous 12 mos or has a future within next 30 days. See \"Patient Info\" tab in inbasket, or \"Choose Columns\" in Meds & " Orders section of the refill encounter.              Passed - Medication is active on med list        Passed - Patient is age 18 or older             Janett Lam RN 07/23/22 7:51 AM

## 2022-10-01 ENCOUNTER — HEALTH MAINTENANCE LETTER (OUTPATIENT)
Age: 42
End: 2022-10-01

## 2023-01-31 ENCOUNTER — OFFICE VISIT (OUTPATIENT)
Dept: INTERNAL MEDICINE | Facility: CLINIC | Age: 43
End: 2023-01-31
Payer: COMMERCIAL

## 2023-01-31 VITALS
BODY MASS INDEX: 29.25 KG/M2 | TEMPERATURE: 97.8 F | OXYGEN SATURATION: 98 % | DIASTOLIC BLOOD PRESSURE: 62 MMHG | HEIGHT: 66 IN | SYSTOLIC BLOOD PRESSURE: 110 MMHG | WEIGHT: 182 LBS | HEART RATE: 66 BPM

## 2023-01-31 DIAGNOSIS — E78.2 MIXED HYPERLIPIDEMIA: ICD-10-CM

## 2023-01-31 DIAGNOSIS — R53.83 OTHER FATIGUE: ICD-10-CM

## 2023-01-31 DIAGNOSIS — R80.9 PROTEINURIA, UNSPECIFIED TYPE: Primary | ICD-10-CM

## 2023-01-31 LAB
ALBUMIN SERPL BCG-MCNC: 4.9 G/DL (ref 3.5–5.2)
ALBUMIN UR-MCNC: 100 MG/DL
ALP SERPL-CCNC: 44 U/L (ref 40–129)
ALT SERPL W P-5'-P-CCNC: 32 U/L (ref 10–50)
ANION GAP SERPL CALCULATED.3IONS-SCNC: 12 MMOL/L (ref 7–15)
APPEARANCE UR: CLEAR
AST SERPL W P-5'-P-CCNC: 26 U/L (ref 10–50)
BACTERIA #/AREA URNS HPF: ABNORMAL /HPF
BILIRUB SERPL-MCNC: 0.6 MG/DL
BILIRUB UR QL STRIP: NEGATIVE
BUN SERPL-MCNC: 27.6 MG/DL (ref 6–20)
CALCIUM SERPL-MCNC: 9.8 MG/DL (ref 8.6–10)
CHLORIDE SERPL-SCNC: 104 MMOL/L (ref 98–107)
CHOLEST SERPL-MCNC: 196 MG/DL
COLOR UR AUTO: YELLOW
CREAT SERPL-MCNC: 1.21 MG/DL (ref 0.67–1.17)
CREAT UR-MCNC: 231 MG/DL
DEPRECATED HCO3 PLAS-SCNC: 24 MMOL/L (ref 22–29)
ERYTHROCYTE [DISTWIDTH] IN BLOOD BY AUTOMATED COUNT: 11.6 % (ref 10–15)
GFR SERPL CREATININE-BSD FRML MDRD: 76 ML/MIN/1.73M2
GLUCOSE SERPL-MCNC: 104 MG/DL (ref 70–99)
GLUCOSE UR STRIP-MCNC: NEGATIVE MG/DL
HCT VFR BLD AUTO: 44.4 % (ref 40–53)
HDLC SERPL-MCNC: 55 MG/DL
HGB BLD-MCNC: 14.9 G/DL (ref 13.3–17.7)
HGB UR QL STRIP: ABNORMAL
KETONES UR STRIP-MCNC: NEGATIVE MG/DL
LDLC SERPL CALC-MCNC: 113 MG/DL
LEUKOCYTE ESTERASE UR QL STRIP: NEGATIVE
MCH RBC QN AUTO: 28.4 PG (ref 26.5–33)
MCHC RBC AUTO-ENTMCNC: 33.6 G/DL (ref 31.5–36.5)
MCV RBC AUTO: 85 FL (ref 78–100)
MICROALBUMIN UR-MCNC: 285 MG/L
MICROALBUMIN/CREAT UR: 123.38 MG/G CR (ref 0–17)
MUCOUS THREADS #/AREA URNS LPF: PRESENT /LPF
NITRATE UR QL: NEGATIVE
NONHDLC SERPL-MCNC: 141 MG/DL
PH UR STRIP: 5.5 [PH] (ref 5–8)
PLATELET # BLD AUTO: 235 10E3/UL (ref 150–450)
POTASSIUM SERPL-SCNC: 3.9 MMOL/L (ref 3.4–5.3)
PROT SERPL-MCNC: 7.6 G/DL (ref 6.4–8.3)
RBC # BLD AUTO: 5.25 10E6/UL (ref 4.4–5.9)
RBC #/AREA URNS AUTO: ABNORMAL /HPF
SODIUM SERPL-SCNC: 140 MMOL/L (ref 136–145)
SP GR UR STRIP: >=1.03 (ref 1–1.03)
TOTAL PROTEIN SERUM FOR ELP: 7.4 G/DL (ref 6.4–8.3)
TRIGL SERPL-MCNC: 140 MG/DL
TSH SERPL DL<=0.005 MIU/L-ACNC: 1.18 UIU/ML (ref 0.3–4.2)
UROBILINOGEN UR STRIP-ACNC: 0.2 E.U./DL
WBC # BLD AUTO: 6.1 10E3/UL (ref 4–11)
WBC #/AREA URNS AUTO: ABNORMAL /HPF

## 2023-01-31 PROCEDURE — 82043 UR ALBUMIN QUANTITATIVE: CPT | Performed by: INTERNAL MEDICINE

## 2023-01-31 PROCEDURE — 84166 PROTEIN E-PHORESIS/URINE/CSF: CPT

## 2023-01-31 PROCEDURE — 82570 ASSAY OF URINE CREATININE: CPT | Performed by: INTERNAL MEDICINE

## 2023-01-31 PROCEDURE — 80061 LIPID PANEL: CPT | Performed by: INTERNAL MEDICINE

## 2023-01-31 PROCEDURE — 84443 ASSAY THYROID STIM HORMONE: CPT | Performed by: INTERNAL MEDICINE

## 2023-01-31 PROCEDURE — 36415 COLL VENOUS BLD VENIPUNCTURE: CPT | Performed by: INTERNAL MEDICINE

## 2023-01-31 PROCEDURE — 85027 COMPLETE CBC AUTOMATED: CPT | Performed by: INTERNAL MEDICINE

## 2023-01-31 PROCEDURE — 86334 IMMUNOFIX E-PHORESIS SERUM: CPT

## 2023-01-31 PROCEDURE — 84155 ASSAY OF PROTEIN SERUM: CPT | Mod: 59 | Performed by: INTERNAL MEDICINE

## 2023-01-31 PROCEDURE — 84165 PROTEIN E-PHORESIS SERUM: CPT

## 2023-01-31 PROCEDURE — 80053 COMPREHEN METABOLIC PANEL: CPT | Performed by: INTERNAL MEDICINE

## 2023-01-31 PROCEDURE — 99214 OFFICE O/P EST MOD 30 MIN: CPT | Performed by: INTERNAL MEDICINE

## 2023-01-31 PROCEDURE — 81001 URINALYSIS AUTO W/SCOPE: CPT | Performed by: INTERNAL MEDICINE

## 2023-01-31 NOTE — PROGRESS NOTES
1. Proteinuria, unspecified type  Labs as below.  Continue with the lisinopril.  Blood pressure looks good.  He will be following up with nephrology in a couple months  - Protein electrophoresis; Future  - Protein Immunofixation Serum; Future  - Protein electrophoresis random urine; Future  - UA Macro with Reflex to Micro and Culture - lab collect; Future  - Albumin Random Urine Quantitative with Creat Ratio; Future  - Comprehensive metabolic panel (BMP + Alb, Alk Phos, ALT, AST, Total. Bili, TP); Future  - CBC with platelets; Future    2. Other fatigue  Possibly some mild seasonal affect of disorder or perhaps a postviral fatigue.  Check labs as below.  Let me know if things or not improving.  - CBC with platelets; Future  - TSH with free T4 reflex; Future    3. Mixed hyperlipidemia  Check lipids on his statin and fibrate.  - Lipid panel reflex to direct LDL Fasting; Future    Subjective   Tye is a 42 year old, presenting for the following health issues:  Hyperlipidemia (6 MO recheck - fasting today for labs ) and Hypertension (Since started Lisinopril (possible side effects of: lack of energy)  )      Hyperlipidemia    History of Present Illness       CKD: He uses over the counter pain medication, including tylenol and advil, a few times a month.    He eats 2-3 servings of fruits and vegetables daily.He consumes 0 sweetened beverage(s) daily.He exercises with enough effort to increase his heart rate 30 to 60 minutes per day.  He exercises with enough effort to increase his heart rate 3 or less days per week.   He is taking medications regularly.     Tye comes in today for follow-up.  He states that ever since having an upper respiratory infection his fall he is just felt like he does not have much energy.  Not a lot of ambition to get up and go workout.  He denies depression and issues.  He denies lightheadedness dizziness or other constitutional symptoms.  He is taking his medications.  He did see nephrology  "who told him he may have IgA nephropathy.  He did have some kappa light chains in his serum but that was not looked at further.  He does note frothy urine but no other change in his urine since I saw him last.  Kids are doing well.  Work is going okay but stressful.          Review of Systems         Objective    /62 (BP Location: Left arm, Patient Position: Sitting, Cuff Size: Adult Regular)   Pulse 66   Temp 97.8  F (36.6  C)   Ht 1.676 m (5' 6\")   Wt 82.6 kg (182 lb)   SpO2 98%   BMI 29.38 kg/m    Body mass index is 29.38 kg/m .  Physical Exam       Pleasant gentleman who looks well                "

## 2023-02-01 LAB
ALBUMIN MFR UR ELPH: 87.4 %
ALBUMIN SERPL ELPH-MCNC: 4.9 G/DL (ref 3.7–5.1)
ALPHA1 GLOB MFR UR ELPH: 1.5 %
ALPHA1 GLOB SERPL ELPH-MCNC: 0.2 G/DL (ref 0.2–0.4)
ALPHA2 GLOB MFR UR ELPH: 2.7 %
ALPHA2 GLOB SERPL ELPH-MCNC: 0.4 G/DL (ref 0.5–0.9)
B-GLOBULIN MFR UR ELPH: 5.8 %
B-GLOBULIN SERPL ELPH-MCNC: 0.8 G/DL (ref 0.6–1)
GAMMA GLOB MFR UR ELPH: 2.6 %
GAMMA GLOB SERPL ELPH-MCNC: 1.1 G/DL (ref 0.7–1.6)
M PROTEIN MFR UR ELPH: 0 %
M PROTEIN SERPL ELPH-MCNC: 0 G/DL
PROT PATTERN SERPL ELPH-IMP: ABNORMAL
PROT PATTERN SERPL IFE-IMP: NORMAL
PROT PATTERN UR ELPH-IMP: ABNORMAL

## 2023-02-11 ENCOUNTER — MYC MEDICAL ADVICE (OUTPATIENT)
Dept: INTERNAL MEDICINE | Facility: CLINIC | Age: 43
End: 2023-02-11
Payer: COMMERCIAL

## 2023-02-11 DIAGNOSIS — E78.2 MIXED HYPERLIPIDEMIA: ICD-10-CM

## 2023-02-11 DIAGNOSIS — Z84.1 FAMILY HISTORY OF RENAL FAILURE: ICD-10-CM

## 2023-02-11 DIAGNOSIS — R80.9 PROTEINURIA, UNSPECIFIED TYPE: ICD-10-CM

## 2023-02-13 RX ORDER — ROSUVASTATIN CALCIUM 10 MG/1
TABLET, COATED ORAL
Qty: 90 TABLET | Refills: 3 | Status: SHIPPED | OUTPATIENT
Start: 2023-02-13 | End: 2024-04-29

## 2023-02-13 RX ORDER — LISINOPRIL 5 MG/1
5 TABLET ORAL DAILY
Qty: 90 TABLET | Refills: 3 | Status: SHIPPED | OUTPATIENT
Start: 2023-02-13 | End: 2023-03-30

## 2023-02-13 RX ORDER — FENOFIBRATE 200 MG/1
CAPSULE ORAL
Qty: 90 CAPSULE | Refills: 3 | Status: SHIPPED | OUTPATIENT
Start: 2023-02-13 | End: 2024-04-29

## 2023-02-22 ENCOUNTER — LAB (OUTPATIENT)
Dept: LAB | Facility: CLINIC | Age: 43
End: 2023-02-22
Payer: COMMERCIAL

## 2023-02-22 DIAGNOSIS — R80.0 ISOLATED NON-NEPHROTIC PROTEINURIA: Primary | ICD-10-CM

## 2023-02-22 DIAGNOSIS — R31.29 MICROSCOPIC HEMATURIA: ICD-10-CM

## 2023-02-22 LAB
ALBUMIN MFR UR ELPH: 41.2 MG/DL (ref 1–14)
ALBUMIN UR-MCNC: 30 MG/DL
ANION GAP SERPL CALCULATED.3IONS-SCNC: 14 MMOL/L (ref 7–15)
APPEARANCE UR: CLEAR
BACTERIA #/AREA URNS HPF: ABNORMAL /HPF
BILIRUB UR QL STRIP: NEGATIVE
BUN SERPL-MCNC: 23.4 MG/DL (ref 6–20)
CALCIUM SERPL-MCNC: 9.6 MG/DL (ref 8.6–10)
CHLORIDE SERPL-SCNC: 105 MMOL/L (ref 98–107)
COLOR UR AUTO: YELLOW
CREAT SERPL-MCNC: 1.24 MG/DL (ref 0.67–1.17)
CREAT UR-MCNC: 143 MG/DL
DEPRECATED HCO3 PLAS-SCNC: 22 MMOL/L (ref 22–29)
GFR SERPL CREATININE-BSD FRML MDRD: 74 ML/MIN/1.73M2
GLUCOSE SERPL-MCNC: 107 MG/DL (ref 70–99)
GLUCOSE UR STRIP-MCNC: NEGATIVE MG/DL
HGB UR QL STRIP: ABNORMAL
KETONES UR STRIP-MCNC: NEGATIVE MG/DL
LEUKOCYTE ESTERASE UR QL STRIP: NEGATIVE
NITRATE UR QL: NEGATIVE
PH UR STRIP: 5 [PH] (ref 5–7)
POTASSIUM SERPL-SCNC: 4.2 MMOL/L (ref 3.4–5.3)
PROT/CREAT 24H UR: 0.29 MG/MG CR (ref 0–0.2)
RBC #/AREA URNS AUTO: ABNORMAL /HPF
SODIUM SERPL-SCNC: 141 MMOL/L (ref 136–145)
SP GR UR STRIP: >=1.03 (ref 1–1.03)
UROBILINOGEN UR STRIP-ACNC: 0.2 E.U./DL
WBC #/AREA URNS AUTO: ABNORMAL /HPF

## 2023-02-22 PROCEDURE — 81001 URINALYSIS AUTO W/SCOPE: CPT

## 2023-02-22 PROCEDURE — 36415 COLL VENOUS BLD VENIPUNCTURE: CPT

## 2023-02-22 PROCEDURE — 80048 BASIC METABOLIC PNL TOTAL CA: CPT

## 2023-02-22 PROCEDURE — 84156 ASSAY OF PROTEIN URINE: CPT

## 2023-03-01 ENCOUNTER — TRANSFERRED RECORDS (OUTPATIENT)
Dept: HEALTH INFORMATION MANAGEMENT | Facility: CLINIC | Age: 43
End: 2023-03-01

## 2023-03-30 ENCOUNTER — OFFICE VISIT (OUTPATIENT)
Dept: INTERNAL MEDICINE | Facility: CLINIC | Age: 43
End: 2023-03-30
Payer: COMMERCIAL

## 2023-03-30 VITALS
WEIGHT: 182 LBS | RESPIRATION RATE: 18 BRPM | HEIGHT: 66 IN | TEMPERATURE: 97.9 F | DIASTOLIC BLOOD PRESSURE: 70 MMHG | OXYGEN SATURATION: 98 % | HEART RATE: 67 BPM | BODY MASS INDEX: 29.25 KG/M2 | SYSTOLIC BLOOD PRESSURE: 110 MMHG

## 2023-03-30 DIAGNOSIS — E78.1 PURE HYPERGLYCERIDEMIA: ICD-10-CM

## 2023-03-30 DIAGNOSIS — N05.9 NEPHRITIS AND NEPHROPATHY: ICD-10-CM

## 2023-03-30 DIAGNOSIS — Z01.818 PREOPERATIVE EXAMINATION: Primary | ICD-10-CM

## 2023-03-30 LAB
ANION GAP SERPL CALCULATED.3IONS-SCNC: 11 MMOL/L (ref 7–15)
BUN SERPL-MCNC: 24.6 MG/DL (ref 6–20)
CALCIUM SERPL-MCNC: 9.6 MG/DL (ref 8.6–10)
CHLORIDE SERPL-SCNC: 107 MMOL/L (ref 98–107)
CREAT SERPL-MCNC: 1.27 MG/DL (ref 0.67–1.17)
DEPRECATED HCO3 PLAS-SCNC: 26 MMOL/L (ref 22–29)
GFR SERPL CREATININE-BSD FRML MDRD: 72 ML/MIN/1.73M2
GLUCOSE SERPL-MCNC: 104 MG/DL (ref 70–99)
HGB BLD-MCNC: 14.6 G/DL (ref 13.3–17.7)
POTASSIUM SERPL-SCNC: 4.2 MMOL/L (ref 3.4–5.3)
SODIUM SERPL-SCNC: 144 MMOL/L (ref 136–145)

## 2023-03-30 PROCEDURE — 99214 OFFICE O/P EST MOD 30 MIN: CPT | Performed by: INTERNAL MEDICINE

## 2023-03-30 PROCEDURE — 80048 BASIC METABOLIC PNL TOTAL CA: CPT | Performed by: INTERNAL MEDICINE

## 2023-03-30 PROCEDURE — 85018 HEMOGLOBIN: CPT | Performed by: INTERNAL MEDICINE

## 2023-03-30 PROCEDURE — 36415 COLL VENOUS BLD VENIPUNCTURE: CPT | Performed by: INTERNAL MEDICINE

## 2023-03-30 RX ORDER — LISINOPRIL 5 MG/1
10 TABLET ORAL DAILY
COMMUNITY
End: 2023-05-02

## 2023-03-30 NOTE — PATIENT INSTRUCTIONS
For informational purposes only. Not to replace the advice of your health care provider. Copyright   2003,  [facility]. All rights reserved. Clinically reviewed by Lor Khanna MD. [employer] [id number] - REV .  Preparing for Your Surgery  Getting started  A nurse will call you to review your health history and instructions. They will give you an arrival time based on your scheduled surgery time. Please be ready to share:  Your doctor's clinic name and phone number  Your medical, surgical, and anesthesia history  A list of allergies and sensitivities  A list of medicines, including herbal treatments and over-the-counter drugs  Whether the patient has a legal guardian (ask how to send us the papers in advance)  Please tell us if you're pregnant--or if there's any chance you might be pregnant. Some surgeries may injure a fetus (unborn baby), so they require a pregnancy test. Surgeries that are safe for a fetus don't always need a test, and you can choose whether to have one.   If you have a child who's having surgery, please ask for a copy of Preparing for Your Child's Surgery.    Preparing for surgery  Within 10 to 30 days of surgery: Have a pre-op exam (sometimes called an H&P, or History and Physical). This can be done at a clinic or pre-operative center.  If you're having a , you may not need this exam. Talk to your care team.  At your pre-op exam, talk to your care team about all medicines you take. If you need to stop any medicines before surgery, ask when to start taking them again.  We do this for your safety. Many medicines can make you bleed too much during surgery. Some change how well surgery (anesthesia) drugs work.  Call your insurance company to let them know you're having surgery. (If you don't have insurance, call [phone].)  Call your clinic if there's any change in your health. This includes signs of a cold or flu (sore throat, runny nose, cough, rash, fever). It  also includes a scrape or scratch near the surgery site.  If you have questions on the day of surgery, call your hospital or surgery center.  Eating and drinking guidelines  For your safety: Unless your surgeon tells you otherwise, follow the guidelines below.  Eat and drink as usual until 8 hours before you arrive for surgery. After that, no food or milk.  Drink clear liquids until 2 hours before you arrive. These are liquids you can see through, like water, Gatorade, and Propel Water. They also include plain black coffee and tea (no cream or milk), candy, and breath mints. You can spit out gum when you arrive.  If you drink alcohol: Stop drinking it the night before surgery.  If your care team tells you to take medicine on the morning of surgery, it's okay to take it with a sip of water.  Preventing infection  Shower or bathe the night before and morning of your surgery. Follow the instructions your clinic gave you. (If no instructions, use regular soap.)  Don't shave or clip hair near your surgery site. We'll remove the hair if needed.  Don't smoke or vape the morning of surgery. You may chew nicotine gum up to 2 hours before surgery. A nicotine patch is okay.  Note: Some surgeries require you to completely quit smoking and nicotine. Check with your surgeon.  Your care team will make every effort to keep you safe from infection. We will:  Clean our hands often with soap and water (or an alcohol-based hand rub).  Clean the skin at your surgery site with a special soap that kills germs.  Give you a special gown to keep you warm. (Cold raises the risk of infection.)  Wear special hair covers, masks, gowns and gloves during surgery.  Give antibiotic medicine, if prescribed. Not all surgeries need antibiotics.  What to bring on the day of surgery  Photo ID and insurance card  Copy of your health care directive, if you have one  Glasses and hearing aids (bring cases)  You can't wear contacts during surgery  Inhaler and  eye drops, if you use them (tell us about these when you arrive)  CPAP machine or breathing device, if you use them  A few personal items, if spending the night  If you have . . .  A pacemaker, ICD (cardiac defibrillator) or other implant: Bring the ID card.  An implanted stimulator: Bring the remote control.  A legal guardian: Bring a copy of the certified (court-stamped) guardianship papers.  Please remove any jewelry, including body piercings. Leave jewelry and other valuables at home.  If you're going home the day of surgery  You must have a responsible adult drive you home. They should stay with you overnight as well.  If you don't have someone to stay with you, and you aren't safe to go home alone, we may keep you overnight. Insurance often won't pay for this.  After surgery  If it's hard to control your pain or you need more pain medicine, please call your surgeon's office.  Questions?   If you have any questions for your care team, list them here: _________________________________________________________________________________________________________________________________________________________________________ ____________________________________ ____________________________________ ____________________________________    How to Take Your Medication Before Surgery    You should not be taking any nonsteroidal anti-inflammatory such as Aleve, ibuprofen, naproxen, Advil etc.    Morning of procedure do not take any medications.  You may take them when you get home.

## 2023-03-30 NOTE — PROGRESS NOTES
[facility]  [address] [address]  Phone: [phone]  Fax: [phone]  Primary Provider: Jose Harris  Pre-op Performing Provider: JOSE HARRIS      PREOPERATIVE EVALUATION:  Today's date: 3/30/2023    Erich Pierce is a 42 year old male who presents for a preoperative evaluation.  Additional Questions 3/30/2023   Roomed by Mamta   Accompanied by N/A     Surgical Information:  Surgery/Procedure: Renal Biopsy   Surgery Location: [facility]   Surgeon: Radiology Provider or Dr. Jackson  Surgery Date: 4/18/23  Time of Surgery: 10:00 am Arrival Time   Where patient plans to recover: At home with family  Fax number for surgical facility: TBD     1. Preoperative examination  Proceed with the renal biopsy as planned.  He was given perioperative medication instructions.  - Hemoglobin; Future  - Basic metabolic panel  (Ca, Cl, CO2, Creat, Gluc, K, Na, BUN); Future  - Hemoglobin  - Basic metabolic panel  (Ca, Cl, CO2, Creat, Gluc, K, Na, BUN)    2. Nephritis and nephropathy  As above.  His most recent urinalysis does show resolution of hematuria and an improvement of the proteinuria on lisinopril.    3. Essential Hypertriglyceridemia  Stable.  Continue his fibrate    Subjective     HPI related to upcoming procedure: Patient had an appendectomy last year and around that time he was found to have hematuria.  This was felt to possibly due to inflammation around the ureter and bladder from his abdominal infection etc. however this persisted and he developed proteinuria as well.  His father had a history of end-stage renal disease of unclear etiology.  Because of the persistent hematuria and proteinuria he is going to be undergoing a renal biopsy for further evaluation next month.  He feels well currently.    Preop Questions 3/30/2023   1. Have you ever had a heart attack or stroke? No   2. Have you ever had surgery on your heart or blood  vessels, such as a stent placement, a coronary artery bypass, or surgery on an artery in your head, neck, heart, or legs? No   3. Do you have chest pain with activity? No   4. Do you have a history of  heart failure? No   5. Do you currently have a cold, bronchitis or symptoms of other infection? No   6. Do you have a cough, shortness of breath, or wheezing? No   7. Do you or anyone in your family have previous history of blood clots? No   8. Do you or does anyone in your family have a serious bleeding problem such as prolonged bleeding following surgeries or cuts? No   9. Have you ever had problems with anemia or been told to take iron pills? No   10. Have you had any abnormal blood loss such as black, tarry or bloody stools? No   11. Have you ever had a blood transfusion? No   12. Are you willing to have a blood transfusion if it is medically needed before, during, or after your surgery? Yes   13. Have you or any of your relatives ever had problems with anesthesia? No   14. Do you have sleep apnea, excessive snoring or daytime drowsiness? No   15. Do you have any artifical heart valves or other implanted medical devices like a pacemaker, defibrillator, or continuous glucose monitor? No   16. Do you have artificial joints? No   17. Are you allergic to latex? No       Health Care Directive:  Patient does not have a Health Care Directive or Living Will: Patient states has Advance Directive and will bring in a copy to clinic.    Preoperative Review of :   reviewed - no record of controlled substances prescribed.      Status of Chronic Conditions:  See problem list for active medical problems.  Problems all longstanding and stable, except as noted/documented.  See ROS for pertinent symptoms related to these conditions.      Review of Systems  Constitutional, neuro, ENT, endocrine, pulmonary, cardiac, gastrointestinal, genitourinary, musculoskeletal, integument and psychiatric systems are negative, except as  otherwise noted.    Patient Active Problem List    Diagnosis Date Noted     Family history of renal failure 05/26/2022     Priority: Medium     Vitamin D deficiency 06/25/2019     Priority: Medium     Multiple food allergies 11/28/2016     Priority: Medium     Multiple environmental allergies 11/28/2016     Priority: Medium     Essential Hypertriglyceridemia      Priority: Medium     Created by Conversion         Serum Enzyme Levels - ALT (SGPT) Elevated      Priority: Medium     Created by Conversion          Past Medical History:   Diagnosis Date     Acute appendicitis, unspecified acute appendicitis type 3/18/2022     Essential hypertriglyceridemia      Grumbling appendix 3/28/2022    Added automatically from request for surgery [id number]     Pain, joint, shoulder      Temporal arteritis (H)      Past Surgical History:   Procedure Laterality Date     DAVINCI APPENDECTOMY N/A 5/9/2022    Procedure: APPENDECTOMY, ROBOT-ASSISTED;  Surgeon: Gerardo Sidhu MD;  Location: [facility] OR     SKIN GRAFT  1990     WISDOM TOOTH EXTRACTION  2006     Current Outpatient Medications   Medication Sig Dispense Refill     cetirizine (ZYRTEC) 10 MG tablet Take 10 mg by mouth daily       cholecalciferol, vitamin D3, 125 mcg (5,000 unit) capsule [CHOLECALCIFEROL, VITAMIN D3, 125 MCG (5,000 UNIT) CAPSULE] Take 5,000 Units by mouth daily.       fenofibrate micronized (LOFIBRA) 200 MG capsule Take 1 capsule (200 mg total) by mouth daily before breakfast. Strength: 200 mg 90 capsule 3     lisinopril (ZESTRIL) 5 MG tablet Take 10 mg by mouth daily       loratadine (CLARITIN) 10 MG tablet Take 10 mg by mouth daily       Multiple Vitamin (MULTIVITAMIN ADULT PO) Take 1 tablet by mouth daily       rosuvastatin (CRESTOR) 10 MG tablet Take 1 tablet (10 mg total) by mouth at bedtime. Strength: 10 mg 90 tablet 3     lisinopril (ZESTRIL) 5 MG tablet Take 1 tablet (5 mg) by mouth daily (Patient not taking: Reported on 3/30/2023) 90 tablet 3  "      No Known Allergies     Social History     Tobacco Use     Smoking status: Never     Smokeless tobacco: Never   Substance Use Topics     Alcohol use: Not on file     Family History   Problem Relation Age of Onset     Leukemia Father          age 64 of acute myelogenous leukimia     Depression Mother      No Known Problems Sister      No Known Problems Daughter      History   Drug Use Not on file         Objective     /70 (BP Location: Left arm, Patient Position: Sitting, Cuff Size: Adult Regular)   Pulse 67   Temp 97.9  F (36.6  C)   Resp 18   Ht 1.676 m (5' 6\")   Wt 82.6 kg (182 lb)   SpO2 98%   BMI 29.38 kg/m      Physical Exam    GENERAL APPEARANCE: healthy, alert and no distress     EYES: EOMI,  PERRL     HENT: ear canals and TM's normal     NECK: no adenopathy, no asymmetry, masses, or scars and thyroid normal to palpation     RESP: lungs clear to auscultation - no rales, rhonchi or wheezes     CV: regular rates and rhythm, normal S1 S2, no S3 or S4 and no murmur, click or rub     ABDOMEN:  soft, nontender, no HSM or masses and bowel sounds normal     MS: extremities normal- no gross deformities noted, no evidence of inflammation in joints, FROM in all extremities.     SKIN: no suspicious lesions or rashes     NEURO: Normal strength and tone, sensory exam grossly normal, mentation intact and speech normal     PSYCH: mentation appears normal. and affect normal/bright     LYMPHATICS: No cervical adenopathy    Recent Labs   Lab Test 23  0905 23  0938 22  0947 22  0930 22  1123   HGB  --  14.9  --   --  15.1   PLT  --  235  --   --  222    140 140   < > 139   POTASSIUM 4.2 3.9 4.7   < > 4.1   CR 1.24* 1.21* 1.19*   < > 1.12   A1C  --   --  6.0*  --   --     < > = values in this interval not displayed.        Diagnostics:  Labs pending at this time.  Results will be reviewed when available.   No EKG required, no history of coronary heart disease, " significant arrhythmia, peripheral arterial disease or other structural heart disease.    Revised Cardiac Risk Index (RCRI):  The patient has the following serious cardiovascular risks for perioperative complications:   - No serious cardiac risks = 0 points     RCRI Interpretation: 0 points: Class I (very low risk - 0.4% complication rate)           Signed Electronically by: JOSE LARA MD  Copy of this evaluation report is provided to requesting physician.

## 2023-05-02 ENCOUNTER — MYC MEDICAL ADVICE (OUTPATIENT)
Dept: INTERNAL MEDICINE | Facility: CLINIC | Age: 43
End: 2023-05-02
Payer: COMMERCIAL

## 2023-05-02 DIAGNOSIS — E78.1 PURE HYPERGLYCERIDEMIA: Primary | ICD-10-CM

## 2023-05-02 RX ORDER — LISINOPRIL 10 MG/1
10 TABLET ORAL DAILY
Qty: 90 TABLET | Refills: 2 | Status: SHIPPED | OUTPATIENT
Start: 2023-05-02 | End: 2023-11-04 | Stop reason: SINTOL

## 2023-05-15 ENCOUNTER — OFFICE VISIT (OUTPATIENT)
Dept: INTERNAL MEDICINE | Facility: CLINIC | Age: 43
End: 2023-05-15
Payer: COMMERCIAL

## 2023-05-15 VITALS
HEART RATE: 62 BPM | RESPIRATION RATE: 18 BRPM | TEMPERATURE: 97.5 F | DIASTOLIC BLOOD PRESSURE: 62 MMHG | HEIGHT: 66 IN | BODY MASS INDEX: 29.57 KG/M2 | WEIGHT: 184 LBS | OXYGEN SATURATION: 98 % | SYSTOLIC BLOOD PRESSURE: 119 MMHG

## 2023-05-15 DIAGNOSIS — Z01.818 PREOPERATIVE EXAMINATION: Primary | ICD-10-CM

## 2023-05-15 DIAGNOSIS — E78.2 MIXED HYPERLIPIDEMIA: ICD-10-CM

## 2023-05-15 DIAGNOSIS — N05.9 NEPHRITIS AND NEPHROPATHY: ICD-10-CM

## 2023-05-15 LAB
ANION GAP SERPL CALCULATED.3IONS-SCNC: 14 MMOL/L (ref 7–15)
BUN SERPL-MCNC: 18.7 MG/DL (ref 6–20)
CALCIUM SERPL-MCNC: 9.8 MG/DL (ref 8.6–10)
CHLORIDE SERPL-SCNC: 106 MMOL/L (ref 98–107)
CREAT SERPL-MCNC: 1.32 MG/DL (ref 0.67–1.17)
DEPRECATED HCO3 PLAS-SCNC: 23 MMOL/L (ref 22–29)
GFR SERPL CREATININE-BSD FRML MDRD: 69 ML/MIN/1.73M2
GLUCOSE SERPL-MCNC: 101 MG/DL (ref 70–99)
HGB BLD-MCNC: 14.3 G/DL (ref 13.3–17.7)
POTASSIUM SERPL-SCNC: 4.2 MMOL/L (ref 3.4–5.3)
SODIUM SERPL-SCNC: 143 MMOL/L (ref 136–145)

## 2023-05-15 PROCEDURE — 99214 OFFICE O/P EST MOD 30 MIN: CPT | Performed by: INTERNAL MEDICINE

## 2023-05-15 PROCEDURE — 85018 HEMOGLOBIN: CPT | Performed by: INTERNAL MEDICINE

## 2023-05-15 PROCEDURE — 80048 BASIC METABOLIC PNL TOTAL CA: CPT | Performed by: INTERNAL MEDICINE

## 2023-05-15 PROCEDURE — 36415 COLL VENOUS BLD VENIPUNCTURE: CPT | Performed by: INTERNAL MEDICINE

## 2023-05-15 NOTE — PROGRESS NOTES
[facility]  [address] [place] [address]  Phone: [phone]  Fax: [phone]  Primary Provider: Jose Harris  Pre-op Performing Provider: JOSE AHRRIS      PREOPERATIVE EVALUATION:  Today's date: 5/15/2023    Erich Pierce is a 42 year old male who presents for a preoperative evaluation.      5/15/2023    10:43 AM   Additional Questions   Roomed by Mamta     Surgical Information:  Surgery/Procedure: Renal Biopsy   Surgery Location: [facility]   Surgeon: Radiology Team   Surgery Date: 5/17/23  Time of Surgery: TBD  Where patient plans to recover: At home with family  Fax number for surgical facility: Note does not need to be faxed, will be available electronically in Epic.    1. Preoperative examination  Proceed with renal biopsy again as planned.  He will take his medications the morning of procedure per the recommendations.  - Basic metabolic panel  (Ca, Cl, CO2, Creat, Gluc, K, Na, BUN); Future  - Hemoglobin; Future    2. Nephritis and nephropathy  As above.    3. Mixed hyperlipidemia  Continue his fibrate.    Subjective     HPI related to upcoming procedure: Did not has to undergo another renal biopsy because his last 1 was apparently inconclusive.  He is feeling well.  No issues with the previous renal biopsy.  Denies somatic concerns for me.        5/8/2023     8:59 PM   Preop Questions   1. Have you ever had a heart attack or stroke? No   2. Have you ever had surgery on your heart or blood vessels, such as a stent placement, a coronary artery bypass, or surgery on an artery in your head, neck, heart, or legs? No   3. Do you have chest pain with activity? No   4. Do you have a history of  heart failure? No   5. Do you currently have a cold, bronchitis or symptoms of other infection? No   6. Do you have a cough, shortness of breath, or wheezing? No   7. Do you or anyone in your family have previous history of blood clots? No   8. Do  you or does anyone in your family have a serious bleeding problem such as prolonged bleeding following surgeries or cuts? No   9. Have you ever had problems with anemia or been told to take iron pills? No   10. Have you had any abnormal blood loss such as black, tarry or bloody stools? No   11. Have you ever had a blood transfusion? No   12. Are you willing to have a blood transfusion if it is medically needed before, during, or after your surgery? Yes   13. Have you or any of your relatives ever had problems with anesthesia? No   14. Do you have sleep apnea, excessive snoring or daytime drowsiness? No   15. Do you have any artifical heart valves or other implanted medical devices like a pacemaker, defibrillator, or continuous glucose monitor? No   16. Do you have artificial joints? No   17. Are you allergic to latex? No       Health Care Directive:  Patient does not have a Health Care Directive or Living Will:     Preoperative Review of :   reviewed - no record of controlled substances prescribed.      Status of Chronic Conditions:  See problem list for active medical problems.  Problems all longstanding and stable, except as noted/documented.  See ROS for pertinent symptoms related to these conditions.      Review of Systems  Constitutional, neuro, ENT, endocrine, pulmonary, cardiac, gastrointestinal, genitourinary, musculoskeletal, integument and psychiatric systems are negative, except as otherwise noted.    Patient Active Problem List    Diagnosis Date Noted     Family history of renal failure 05/26/2022     Priority: Medium     Vitamin D deficiency 06/25/2019     Priority: Medium     Multiple food allergies 11/28/2016     Priority: Medium     Multiple environmental allergies 11/28/2016     Priority: Medium     Essential Hypertriglyceridemia      Priority: Medium     Created by Conversion         Serum Enzyme Levels - ALT (SGPT) Elevated      Priority: Medium     Created by Conversion          Past  Medical History:   Diagnosis Date     Acute appendicitis, unspecified acute appendicitis type 3/18/2022     Essential hypertriglyceridemia      Grumbling appendix 3/28/2022    Added automatically from request for surgery [id number]     Pain, joint, shoulder      Temporal arteritis (H)      Past Surgical History:   Procedure Laterality Date     DAVINCI APPENDECTOMY N/A 2022    Procedure: APPENDECTOMY, ROBOT-ASSISTED;  Surgeon: Gerardo Sidhu MD;  Location: [facility] OR     SKIN GRAFT       WISDOM TOOTH EXTRACTION       Current Outpatient Medications   Medication Sig Dispense Refill     cetirizine (ZYRTEC) 10 MG tablet Take 10 mg by mouth daily       cholecalciferol, vitamin D3, 125 mcg (5,000 unit) capsule [CHOLECALCIFEROL, VITAMIN D3, 125 MCG (5,000 UNIT) CAPSULE] Take 5,000 Units by mouth daily.       fenofibrate micronized (LOFIBRA) 200 MG capsule Take 1 capsule (200 mg total) by mouth daily before breakfast. Strength: 200 mg 90 capsule 3     lisinopril (ZESTRIL) 10 MG tablet Take 1 tablet (10 mg) by mouth daily 90 tablet 2     loratadine (CLARITIN) 10 MG tablet Take 10 mg by mouth daily       Multiple Vitamin (MULTIVITAMIN ADULT PO) Take 1 tablet by mouth daily       rosuvastatin (CRESTOR) 10 MG tablet Take 1 tablet (10 mg total) by mouth at bedtime. Strength: 10 mg 90 tablet 3       No Known Allergies     Social History     Tobacco Use     Smoking status: Never     Smokeless tobacco: Never   Vaping Use     Vaping status: Not on file   Substance Use Topics     Alcohol use: Not on file     Family History   Problem Relation Age of Onset     Leukemia Father          age 64 of acute myelogenous leukimia     Depression Mother      No Known Problems Sister      No Known Problems Daughter      History   Drug Use Not on file         Objective     /62 (BP Location: Left arm, Patient Position: Sitting, Cuff Size: Adult Regular)   Pulse 62   Temp 97.5  F (36.4  C) (Tympanic)   Resp 18   Ht  "1.676 m (5' 6\")   Wt 83.5 kg (184 lb)   SpO2 98%   BMI 29.70 kg/m      Physical Exam    GENERAL APPEARANCE: healthy, alert and no distress     EYES: EOMI,  PERRL     HENT: ear canals and TM's normal     NECK: no adenopathy, no asymmetry, masses, or scars and thyroid normal to palpation     RESP: lungs clear to auscultation - no rales, rhonchi or wheezes     CV: regular rates and rhythm, normal S1 S2, no S3 or S4 and no murmur, click or rub     ABDOMEN:  soft, nontender, no HSM or masses and bowel sounds normal     MS: extremities normal- no gross deformities noted, no evidence of inflammation in joints, FROM in all extremities.     SKIN: no suspicious lesions or rashes     NEURO: Normal strength and tone, sensory exam grossly normal, mentation intact and speech normal     PSYCH: mentation appears normal. and affect normal/bright     LYMPHATICS: No cervical adenopathy    Recent Labs   Lab Test 03/30/23  0953 02/22/23  0905 01/31/23  0938 07/21/22  0947 05/26/22  0930 05/04/22  1123   HGB 14.6  --  14.9  --   --  15.1   PLT  --   --  235  --   --  222    141 140 140   < > 139   POTASSIUM 4.2 4.2 3.9 4.7   < > 4.1   CR 1.27* 1.24* 1.21* 1.19*   < > 1.12   A1C  --   --   --  6.0*  --   --     < > = values in this interval not displayed.        Diagnostics:  Labs pending at this time.  Results will be reviewed when available.   No EKG required for low risk surgery (cataract, skin procedure, breast biopsy, etc).    Revised Cardiac Risk Index (RCRI):  The patient has the following serious cardiovascular risks for perioperative complications:   - No serious cardiac risks = 0 points     RCRI Interpretation: 0 points: Class I (very low risk - 0.4% complication rate)           Signed Electronically by: JOSE LARA MD  Copy of this evaluation report is provided to requesting physician.      "

## 2023-06-27 ENCOUNTER — MEDICAL CORRESPONDENCE (OUTPATIENT)
Dept: HEALTH INFORMATION MANAGEMENT | Facility: CLINIC | Age: 43
End: 2023-06-27
Payer: COMMERCIAL

## 2023-06-27 DIAGNOSIS — R31.29 MICROSCOPIC HEMATURIA: ICD-10-CM

## 2023-06-27 DIAGNOSIS — R80.9 PROTEINURIA, UNSPECIFIED TYPE: Primary | ICD-10-CM

## 2023-07-11 ENCOUNTER — PATIENT OUTREACH (OUTPATIENT)
Dept: CARE COORDINATION | Facility: CLINIC | Age: 43
End: 2023-07-11
Payer: COMMERCIAL

## 2023-07-25 ENCOUNTER — PATIENT OUTREACH (OUTPATIENT)
Dept: CARE COORDINATION | Facility: CLINIC | Age: 43
End: 2023-07-25
Payer: COMMERCIAL

## 2023-07-27 ENCOUNTER — MEDICAL CORRESPONDENCE (OUTPATIENT)
Dept: HEALTH INFORMATION MANAGEMENT | Facility: CLINIC | Age: 43
End: 2023-07-27
Payer: COMMERCIAL

## 2023-08-01 ENCOUNTER — TRANSCRIBE ORDERS (OUTPATIENT)
Dept: MULTI SPECIALTY CLINIC | Facility: CLINIC | Age: 43
End: 2023-08-01
Payer: COMMERCIAL

## 2023-08-01 DIAGNOSIS — Z84.1: Primary | ICD-10-CM

## 2023-08-28 DIAGNOSIS — R31.29 MICROSCOPIC HEMATURIA: ICD-10-CM

## 2023-08-28 DIAGNOSIS — R80.0 ISOLATED NON-NEPHROTIC PROTEINURIA: Primary | ICD-10-CM

## 2023-08-28 DIAGNOSIS — I10 ESSENTIAL HYPERTENSION, MALIGNANT: ICD-10-CM

## 2023-09-07 ENCOUNTER — LAB (OUTPATIENT)
Dept: LAB | Facility: CLINIC | Age: 43
End: 2023-09-07
Payer: COMMERCIAL

## 2023-09-07 DIAGNOSIS — I10 ESSENTIAL HYPERTENSION, MALIGNANT: ICD-10-CM

## 2023-09-07 DIAGNOSIS — R80.0 ISOLATED NON-NEPHROTIC PROTEINURIA: ICD-10-CM

## 2023-09-07 DIAGNOSIS — R80.9 PROTEINURIA, UNSPECIFIED TYPE: ICD-10-CM

## 2023-09-07 DIAGNOSIS — R31.29 MICROSCOPIC HEMATURIA: ICD-10-CM

## 2023-09-07 LAB
ALBUMIN UR-MCNC: 100 MG/DL
APPEARANCE UR: CLEAR
BACTERIA #/AREA URNS HPF: ABNORMAL /HPF
BASOPHILS # BLD AUTO: 0 10E3/UL (ref 0–0.2)
BASOPHILS NFR BLD AUTO: 0 %
BILIRUB UR QL STRIP: NEGATIVE
COLOR UR AUTO: YELLOW
EOSINOPHIL # BLD AUTO: 0.2 10E3/UL (ref 0–0.7)
EOSINOPHIL NFR BLD AUTO: 2 %
ERYTHROCYTE [DISTWIDTH] IN BLOOD BY AUTOMATED COUNT: 11.8 % (ref 10–15)
ERYTHROCYTE [SEDIMENTATION RATE] IN BLOOD BY WESTERGREN METHOD: 7 MM/HR (ref 0–15)
GLUCOSE UR STRIP-MCNC: NEGATIVE MG/DL
HCT VFR BLD AUTO: 44.3 % (ref 40–53)
HGB BLD-MCNC: 15.2 G/DL (ref 13.3–17.7)
HGB UR QL STRIP: ABNORMAL
IMM GRANULOCYTES # BLD: 0 10E3/UL
IMM GRANULOCYTES NFR BLD: 0 %
KETONES UR STRIP-MCNC: NEGATIVE MG/DL
LEUKOCYTE ESTERASE UR QL STRIP: NEGATIVE
LYMPHOCYTES # BLD AUTO: 2.2 10E3/UL (ref 0.8–5.3)
LYMPHOCYTES NFR BLD AUTO: 20 %
MCH RBC QN AUTO: 29.2 PG (ref 26.5–33)
MCHC RBC AUTO-ENTMCNC: 34.3 G/DL (ref 31.5–36.5)
MCV RBC AUTO: 85 FL (ref 78–100)
MONOCYTES # BLD AUTO: 0.8 10E3/UL (ref 0–1.3)
MONOCYTES NFR BLD AUTO: 7 %
MUCOUS THREADS #/AREA URNS LPF: PRESENT /LPF
NEUTROPHILS # BLD AUTO: 7.7 10E3/UL (ref 1.6–8.3)
NEUTROPHILS NFR BLD AUTO: 71 %
NITRATE UR QL: NEGATIVE
PH UR STRIP: 6 [PH] (ref 5–7)
PLATELET # BLD AUTO: 226 10E3/UL (ref 150–450)
RBC # BLD AUTO: 5.2 10E6/UL (ref 4.4–5.9)
RBC #/AREA URNS AUTO: ABNORMAL /HPF
SP GR UR STRIP: >=1.03 (ref 1–1.03)
SQUAMOUS #/AREA URNS AUTO: ABNORMAL /LPF
UROBILINOGEN UR STRIP-ACNC: 0.2 E.U./DL
WBC # BLD AUTO: 11 10E3/UL (ref 4–11)
WBC #/AREA URNS AUTO: ABNORMAL /HPF

## 2023-09-07 PROCEDURE — 36415 COLL VENOUS BLD VENIPUNCTURE: CPT

## 2023-09-07 PROCEDURE — 81001 URINALYSIS AUTO W/SCOPE: CPT

## 2023-09-07 PROCEDURE — 80069 RENAL FUNCTION PANEL: CPT

## 2023-09-07 PROCEDURE — 85652 RBC SED RATE AUTOMATED: CPT

## 2023-09-07 PROCEDURE — 84156 ASSAY OF PROTEIN URINE: CPT

## 2023-09-07 PROCEDURE — 85025 COMPLETE CBC W/AUTO DIFF WBC: CPT

## 2023-09-08 LAB
ALBUMIN MFR UR ELPH: 49.7 MG/DL
ALBUMIN SERPL BCG-MCNC: 4.9 G/DL (ref 3.5–5.2)
ANION GAP SERPL CALCULATED.3IONS-SCNC: 11 MMOL/L (ref 7–15)
BUN SERPL-MCNC: 20.7 MG/DL (ref 6–20)
CALCIUM SERPL-MCNC: 9.5 MG/DL (ref 8.6–10)
CHLORIDE SERPL-SCNC: 103 MMOL/L (ref 98–107)
CREAT SERPL-MCNC: 1.23 MG/DL (ref 0.67–1.17)
CREAT UR-MCNC: 224 MG/DL
DEPRECATED HCO3 PLAS-SCNC: 26 MMOL/L (ref 22–29)
EGFRCR SERPLBLD CKD-EPI 2021: 75 ML/MIN/1.73M2
GLUCOSE SERPL-MCNC: 100 MG/DL (ref 70–99)
PHOSPHATE SERPL-MCNC: 3.4 MG/DL (ref 2.5–4.5)
POTASSIUM SERPL-SCNC: 4.2 MMOL/L (ref 3.4–5.3)
PROT/CREAT 24H UR: 0.22 MG/MG CR (ref 0–0.2)
SODIUM SERPL-SCNC: 140 MMOL/L (ref 136–145)

## 2023-09-18 ENCOUNTER — DOCUMENTATION ONLY (OUTPATIENT)
Dept: MULTI SPECIALTY CLINIC | Facility: CLINIC | Age: 43
End: 2023-09-18

## 2023-09-18 NOTE — PROGRESS NOTES
This patient has a future lab only appointment and needs orders. Please send orders. [facility]

## 2023-09-21 ENCOUNTER — APPOINTMENT (OUTPATIENT)
Dept: LAB | Facility: CLINIC | Age: 43
End: 2023-09-21
Payer: COMMERCIAL

## 2023-09-25 ENCOUNTER — APPOINTMENT (OUTPATIENT)
Dept: LAB | Facility: CLINIC | Age: 43
End: 2023-09-25
Payer: COMMERCIAL

## 2023-09-25 NOTE — CONFIDENTIAL NOTE
DIAGNOSIS:  Family history of renal failure syndrome    DATE RECEIVED: 12.07.2023    NOTES STATUS DETAILS   OFFICE NOTE from referring provider Internal 08.01.2023 Kendrick Jackson MD    OFFICE NOTE from other specialist  Internal 07.21.2022 Damaso Harris MD    *Only VASCULITIS or LUPUS gather office notes for the following     *PULMONARY       *ENT     *DERMATOLOGY     *RHEUMATOLOGY     DISCHARGE SUMMARY from hospital     DISCHARGE REPORT from the ER     MEDICATION LIST Internal    IMAGING  (NEED IMAGES AND REPORTS)     KIDNEY CT SCAN     KIDNEY ULTRASOUND     MR ABDOMEN     NUCLEAR MEDICINE RENAL     LABS     CBC Internal 09.07.2023   CMP Internal 09.07.2023   BMP     UA Internal 09.07.2023   URINE PROTEIN Internal 09.07.2023   RENAL PANEL Internal 09.07.2023   BIOPSY     KIDNEY BIOPSY

## 2023-10-15 ENCOUNTER — HEALTH MAINTENANCE LETTER (OUTPATIENT)
Age: 43
End: 2023-10-15

## 2023-11-03 ENCOUNTER — VIRTUAL VISIT (OUTPATIENT)
Dept: NEPHROLOGY | Facility: CLINIC | Age: 43
End: 2023-11-03
Payer: COMMERCIAL

## 2023-11-03 DIAGNOSIS — N02.9 THIN BASEMENT MEMBRANE DISEASE: ICD-10-CM

## 2023-11-03 DIAGNOSIS — R80.9 MICROALBUMINURIA: ICD-10-CM

## 2023-11-03 DIAGNOSIS — E78.1 PURE HYPERGLYCERIDEMIA: ICD-10-CM

## 2023-11-03 DIAGNOSIS — Z84.1 FAMILY HISTORY OF CHRONIC KIDNEY DISEASE: Primary | ICD-10-CM

## 2023-11-03 PROCEDURE — 99213 OFFICE O/P EST LOW 20 MIN: CPT | Mod: VID

## 2023-11-03 ASSESSMENT — PAIN SCALES - GENERAL: PAINLEVEL: NO PAIN (0)

## 2023-11-03 NOTE — PROGRESS NOTES
"Outpatient Consultation    Consultation requested by Kendrick Jackson.      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  We switched to telephone after technical difficulties.  History is from the patient and EHR including Care Everywhere.    HPI:      The patient was found to have hematuria and proteinuria prior to appendectomy in May 2022.  He came under the care Dr. Kendrick Jackson, undergoing kidney biopsy in May of this year with findings of thin basement membrane disease and preserved staining for Alport antigens.  Creatinines since June 2019 have fluctuated in the range 1.13 to 1.32.  Spot urine from February 2023 estimated 123 mg microalbumin.    The patient works in tech full-time and primarily remote.  He feels \"good,\" endorsing some low energy.  He denies chest pain, dyspnea, poor sleep, poor appetite, or significant gastrointestinal symptoms.  He describes low fluid intake with urine color that he associates accordingly but denies hematuria.    The patient has used Advil and Tylenol periodically for headaches.    PMHx:    1.  Thin basement membrane disease, as above.  2.  Appendectomy, as above.  3.  Hypertriglyceridemia.    FHx:    Father was on dialysis undergoing kidney transplant at age 55, dying of AML at age 64.  Older sister with type 2 diabetes.  Three children apparently without urine testing.  Seven paternal aunts and uncles with whom he is out of regular contact, as well as their children, i.e. his first cousins.    SHx:    As above.  No tobacco.  Periodic alcohol.    Active Medications:  Current Outpatient Medications   Medication Sig Dispense Refill    cetirizine (ZYRTEC) 10 MG tablet Take 10 mg by mouth daily      cholecalciferol, vitamin D3, 125 mcg (5,000 unit) capsule [CHOLECALCIFEROL, VITAMIN D3, 125 MCG (5,000 UNIT) CAPSULE] Take 5,000 Units by mouth daily.  "     fenofibrate micronized (LOFIBRA) 200 MG capsule Take 1 capsule (200 mg total) by mouth daily before breakfast. Strength: 200 mg 90 capsule 3    loratadine (CLARITIN) 10 MG tablet Take 10 mg by mouth daily      Multiple Vitamin (MULTIVITAMIN ADULT PO) Take 1 tablet by mouth daily      rosuvastatin (CRESTOR) 10 MG tablet Take 1 tablet (10 mg total) by mouth at bedtime. Strength: 10 mg 90 tablet 3    lisinopril (ZESTRIL) 10 MG tablet Take 1 tablet (10 mg) by mouth daily 90 tablet 2        Medications reviewed and reconciled.  He is not taking lisinopril.  He is taking Jardiance.    Review of Systems:    10 point review of systems is notable for items above.    Physical Exam:      There were no vitals taken for this visit.    Labs and Imaging:    None.    Impression:    1.  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  2.  Microalbuminuria.  3.  Family history of end-stage kidney disease.  4.  Hypertriglyceridemia.  5.  [facility].  Return as needed.    Clinical and pathologic findings are compatible with COL4A3- or [id number] Alport syndrome.  Molecular diagnosis could well inform current and future management, with perhaps more limited prospects for kidney prognosis.  Alport phenocopy cannot be excluded.    Plan:      1.  Genetic Counseling referral.  2.  Today's notes copied to referring and other providers, with gratitude for the opportunity to participate in the patient s care, and offer of availability for further questions or concerns.     Patient Education:    The patient verbalized sophisticated knowledge and understanding of thin basement membrane disease and its possible genetic underpinnings.  We broached possible risks and benefits of genetic testing including that of nondiagnostic results.  We also discussed the possibility that genetic diagnosis might or might not offer prognostic information, of relevance in light of his  father's end-stage kidney disease.  After discussion, he opted for Genetic Counseling referral, so that he and his wife could more thoroughly weigh options.    Sincerely,    Hari Beaulieu MD, PhD  Nephrology    CC:   MILTON LUA BRETT W    Copy to patient     [address] [facility] [address]

## 2023-11-03 NOTE — CONFIDENTIAL NOTE
Outpatient Consultation    Consultation requested by Milton Jackson.      Chief Complaint:    ***    HPI:      ***    PMHx:    ***    FHx:    ***    SHx:    ***    Active Medications:  Current Outpatient Medications   Medication Sig Dispense Refill    cetirizine (ZYRTEC) 10 MG tablet Take 10 mg by mouth daily      cholecalciferol, vitamin D3, 125 mcg (5,000 unit) capsule [CHOLECALCIFEROL, VITAMIN D3, 125 MCG (5,000 UNIT) CAPSULE] Take 5,000 Units by mouth daily.      fenofibrate micronized (LOFIBRA) 200 MG capsule Take 1 capsule (200 mg total) by mouth daily before breakfast. Strength: 200 mg 90 capsule 3    loratadine (CLARITIN) 10 MG tablet Take 10 mg by mouth daily      Multiple Vitamin (MULTIVITAMIN ADULT PO) Take 1 tablet by mouth daily      rosuvastatin (CRESTOR) 10 MG tablet Take 1 tablet (10 mg total) by mouth at bedtime. Strength: 10 mg 90 tablet 3    lisinopril (ZESTRIL) 10 MG tablet Take 1 tablet (10 mg) by mouth daily 90 tablet 2        ***    Review of Systems:    ***    Physical Exam:    There were no vitals taken for this visit.    ***    Labs and Imaging:    ***    Impression:    ***    Plan:      ***  Today's notes copied to referring and other providers, with gratitude for the opportunity to participate in the patient s care, and offer of availability for further questions or concerns.       Patient Education:    ***             Sincerely,    Hari Beaulieu MD, PhD  Nephrology    CC:   MILTON JACKSON    Copy to patient     [address] [facility] [address]

## 2023-11-03 NOTE — NURSING NOTE
Is the patient currently in the state of [place]? YES    Visit mode:VIDEO    If the visit is dropped, the patient can be reconnected by: VIDEO VISIT: Text to cell phone:   Telephone Information:   Mobile [phone]       Will anyone else be joining the visit? NO  (If patient encounters technical issues they should call [phone]    How would you like to obtain your AVS? MyChart    Are changes needed to the allergy or medication list? No    Reason for visit: Consult    Shelby Kocher VVF

## 2023-11-03 NOTE — LETTER
"11/3/2023      RE: Erich Pierce  [address]  [facility] [address]     Dear Colleague,    Thank you for the opportunity to participate in the care of your patient, Erich Pierce, at the [facility] at [facility]. Please see a copy of my visit note below.    Outpatient Consultation    Consultation requested by Kendrick Jackson.      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  We switched to telephone after technical difficulties.  History is from the patient and EHR including Care Everywhere.    HPI:      The patient was found to have hematuria and proteinuria prior to appendectomy in May 2022.  He came under the care Dr. Kendrick Jackson, undergoing kidney biopsy in May of this year with findings of thin basement membrane disease and preserved staining for Alport antigens.  Creatinines since June 2019 have fluctuated in the range 1.13 to 1.32.  Spot urine from February 2023 estimated 123 mg microalbumin.    The patient works in tech full-time and primarily remote.  He feels \"good,\" endorsing some low energy.  He denies chest pain, dyspnea, poor sleep, poor appetite, or significant gastrointestinal symptoms.  He describes low fluid intake with urine color that he associates accordingly but denies hematuria.    The patient has used Advil and Tylenol periodically for headaches.    PMHx:    1.  Thin basement membrane disease, as above.  2.  Appendectomy, as above.  3.  Hypertriglyceridemia.    FHx:    Father was on dialysis undergoing kidney transplant at age 55, dying of AML at age 64.  Older sister with type 2 diabetes.  Three children apparently without urine testing.  Seven paternal aunts and uncles with whom he is out of regular contact, as well as their children, i.e. his first " cousins.    SHx:    As above.  No tobacco.  Periodic alcohol.    Active Medications:  Current Outpatient Medications   Medication Sig Dispense Refill    cetirizine (ZYRTEC) 10 MG tablet Take 10 mg by mouth daily      cholecalciferol, vitamin D3, 125 mcg (5,000 unit) capsule [CHOLECALCIFEROL, VITAMIN D3, 125 MCG (5,000 UNIT) CAPSULE] Take 5,000 Units by mouth daily.      fenofibrate micronized (LOFIBRA) 200 MG capsule Take 1 capsule (200 mg total) by mouth daily before breakfast. Strength: 200 mg 90 capsule 3    loratadine (CLARITIN) 10 MG tablet Take 10 mg by mouth daily      Multiple Vitamin (MULTIVITAMIN ADULT PO) Take 1 tablet by mouth daily      rosuvastatin (CRESTOR) 10 MG tablet Take 1 tablet (10 mg total) by mouth at bedtime. Strength: 10 mg 90 tablet 3    lisinopril (ZESTRIL) 10 MG tablet Take 1 tablet (10 mg) by mouth daily 90 tablet 2        Medications reviewed and reconciled.  He is not taking lisinopril.  He is taking Jardiance.    Review of Systems:    10 point review of systems is notable for items above.    Physical Exam:      There were no vitals taken for this visit.    Labs and Imaging:    None.    Impression:    1.  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  2.  Microalbuminuria.  3.  Family history of end-stage kidney disease.  4.  Hypertriglyceridemia.  5.  Discharge from [facility].  Return as needed.    Clinical and pathologic findings are compatible with COL4A3- or [id number] Alport syndrome.  Molecular diagnosis could well inform current and future management, with perhaps more limited prospects for kidney prognosis.  Alport phenocopy cannot be excluded.    Plan:      1.  Genetic Counseling referral.  2.  Today's notes copied to referring and other providers, with gratitude for the opportunity to participate in the patient s care, and offer of availability for further questions or concerns.     Patient Education:    The  patient verbalized sophisticated knowledge and understanding of thin basement membrane disease and its possible genetic underpinnings.  We broached possible risks and benefits of genetic testing including that of nondiagnostic results.  We also discussed the possibility that genetic diagnosis might or might not offer prognostic information, of relevance in light of his father's end-stage kidney disease.  After discussion, he opted for Genetic Counseling referral, so that he and his wife could more thoroughly weigh options.    Sincerely,    Hari Beaulieu MD, PhD  Nephrology    CC:   MILTON LUA BRETT W    Copy to patient     [address] [facility] [address]

## 2023-12-07 ENCOUNTER — PRE VISIT (OUTPATIENT)
Dept: NEPHROLOGY | Facility: CLINIC | Age: 43
End: 2023-12-07
Payer: COMMERCIAL

## 2024-01-29 NOTE — PROGRESS NOTES
Video-Visit Details    Type of service:  Video Visit    Video Start Time: 8:54 am    Video End Time: 9:15 am    Originating Location (pt. Location): Home    Distant Location (provider location):  Off-site    Mode of Communication:  Video Conference via [employer]an[employer]      Name:  Erich Pierce  :   1980  MRN:   [id number]  Date of service: 2024  Primary Provider: Damaso Harris  Referring Provider: Hari Beaulieu    Presenting Information:  Tye, a 43 year old male, was seen for a video visit at the [facility] clinic for evaluation of personal and family history of kidney disease. I met with Tye at the request of Dr. Beaulieu to obtain a family history, to discuss possible genetic contributions to his symptoms, and to obtain informed consent for genetic testing.       Personal History:   Tye was found to have hematuria and proteinuria prior to appendectomy in May 2022.  He came under the care Dr. Kendrick Jackson, undergoing kidney biopsy in May of this year with findings of thin basement membrane disease.  Creatinines since 2019 have fluctuated in the range 1.13 to 1.32.  Spot urine from 2023 estimated 123 mg microalbumin. Refer to Dr. Beaulieu's note for a more detailed personal history.     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 A copy is located in the media tab and full details are  available in the pedigree section of the history tab. The following information was provided:     Children:   Daughter (9y) is well. She has not had any urine analysis.  Son (6y) is well. He has not had any urine analysis.  Son (3y) has allergies. He has not had any urine analysis.   Siblings:  Sister (45y) has type 2 diabetes.  She has two daughters who are well.   Maternal Relatives:  Mother (68y) is well to Tye's knowledge (limited contact).  Tye has limited information about his aunts, uncles, and cousins.   Grandmother is . No information available.  Grandfather is . No information available.   Paternal Relatives:  Father passed away at age 64 from AML. He was on dialysis and underwent kidney transplant at age 55. Limited information is available about his initial diagnosis with kidney disease.   Tye has limited information about his aunts, uncles, and cousins.   Grandmother is . No information available.  Grandfather is . No information available.     The family history is otherwise negative for developmental delay, intellectual disability, congenital heart disease, birth defects, hearing loss, vision problems, FSGS, chronic kidney disease, renal failure, dialysis, renal transplant, recurrent kidney stones, kidney cysts, abnormally placed/shaped kidneys, proteinuria, hematuria, abnormal renal biopsy, abnormal renal imaging, recurrent urinary tract/bladder/kidney infections, or genetic testing. Consanguinity is denied.      Pedigree Image    Discussion and Assessment:  Genes are long stretches of DNA that are responsible for how our bodies look and how our bodies work. Our genes are inherited on structures called chromosomes of which we have 23 pairs.  One copy of each chromosome in a pair is inherited from the mother and one is inherited from the father. The first 22 pairs of chromosomes are the same in males and females while the final pair of chromosomes, the sex  145 chromosomes (X and Y), are different in males and females. Males have one copy of the X-chromosome and one copy of the Y-chromosome while females have two copies of the X-chromosome. Therefore females have two copies of each gene on the X-chromosome while males have just one. Changes in the DNA sequence of a gene, called variants, as well as changes within the chromosomes can cause the signs and symptoms of a genetic condition. It is possible that Tye's personal and family history are due to a genetic condition, such as Alport syndrome.      Alport syndrome is a genetic disorder characterized by kidney disease, hearing loss, and eye abnormalities. Most affected individuals have hematuria and proteinuria which is indicative of decreased kidney function. Progressive kidney dysfunction can lead to end-stage renal disease (age of onset varies by inheritance type and gene). Other features of this condition include onset of sensorineural hearing loss (onset varies by inheritance type), and anterior lenticonus (abnormally shaped eye lenses). Alport syndrome demonstrates clinical variability, even among affected individuals in the same family.       Alport syndrome is caused by pathogenic variants in one of three genes (COL4A3, COL4A4, COL4A5). These genes contribute to the creation of type IV collagen, a protein that plays an important role in kidney function and it is an important component of inner ear structures. Variants in the various type IV collagen genes alter the structure/function of the collagen protein, which in turn causes the features of Alport syndrome including renal dysfunction, hearing loss, and occasionally eye abnormalities.       Genetic testing for Alport syndrome involves next generation sequencing of the a panel of 5 genes associated with Alport syndrome and related conditions to look for single nucleotide misspellings, as well as deletion/duplication analysis to look for missing or extra chunks of  DNA within the gene. The potential results were discussed including a positive, negative, or variant of uncertain significance.     One possibility is a change(s) could be seen in Tye and this change(s) is known to cause similar symptoms to the symptoms Tye has experienced.  This is considered a positive result.  A positive result may provide more information on appropriate clinical management for Tye and may provide information on additional potential health risks associated with Tye's diagnosis.  A positive result can also have implications for the health and reproductive risks of other relatives.  It is also possible that no change(s) that are likely to explain Tye's symptoms are found.  This is considered a negative result.  A negative result would not completely rule out a possible genetic cause for Tye's symptoms.  Not all changes in our genes cause disease.  Sometimes, it can be difficult for the laboratory to determine whether or not a change that is found contributes to the patient's symptoms.  If the meaning of a particular gene change is unknown, the lab classifies the result as a variant of unknown significance (VUS). Follow-up testing of relatives may be beneficial in clarifying the meaning of this result.     It is medically necessary to determine if there is an underlying genetic cause for Tye's symptoms for several reasons. First and foremost this can be important for his own health. It is possible that an underlying cause may also predispose Tye to other health risks. Knowing about these additional health risks can help us stay ahead of Tye's healthcare to more appropriately screen for other complications.  Some diagnoses may also have treatment options. Additionally, discovering an underlying reason may help predict the chance for other family members to have similar healthcare needs. Finally, having a specific underlying diagnosis can sometimes help individuals receive the services they  need to help reach their full potential in school, in work, or in day to day life.     The lab we are using for this test is called [facility]. They will send a buccal kit directly to Tye who will then be instructed to collect the specimen and mail it back to the lab. This kit must be completed and appropriately labelled with name and date of birth.    Insurance and billing procedures were covered with Tye. Tye will be sent a link where he can access [facility]'s billing policy. Yasmine will submit a claim to Tye's insurance. If Tye owes more than $100 after the claim is processed, he will be contacted by Yasmine to discuss billing options including financial assistance.     Tye provided informed consent for the testing. I will plan to follow-up with Tye by phone when results are returned, approximately 2-3 weeks after the testing is initiated. A follow-up appointment will be scheduled as needed. Additional questions or concerns were denied at this time.        Plan:  A buccal swab kit will be sent directly to Tye from the laboratory.    Once the lab receives the sample, Tye's Alport syndrome panel will be initiated.    Results are expected in approximately 2-3 weeks and will be returned by phone. A follow-up appointment will be scheduled as needed at that time.    Contact information was provided should any questions arise in the future.       Suzy Boyd [facility]  Genetic Counselor  CLARA Stauffer   Phone: [phone]     Approximate Time Spent in Consultation: 21 min

## 2024-02-02 ENCOUNTER — VIRTUAL VISIT (OUTPATIENT)
Dept: NEPHROLOGY | Facility: CLINIC | Age: 44
End: 2024-02-02
Attending: GENETIC COUNSELOR, MS
Payer: COMMERCIAL

## 2024-02-02 DIAGNOSIS — Z71.83 ENCOUNTER FOR NONPROCREATIVE GENETIC COUNSELING: Primary | ICD-10-CM

## 2024-02-02 DIAGNOSIS — Z84.1 FAMILY HISTORY OF CHRONIC KIDNEY DISEASE: ICD-10-CM

## 2024-02-02 DIAGNOSIS — R31.9 HEMATURIA: ICD-10-CM

## 2024-02-02 DIAGNOSIS — N02.9 THIN BASEMENT MEMBRANE DISEASE: ICD-10-CM

## 2024-02-02 DIAGNOSIS — R80.9 PROTEINURIA: ICD-10-CM

## 2024-02-02 PROCEDURE — 96040 HC GENETIC COUNSELING, EACH 30 MINUTES: CPT | Mod: GT,95 | Performed by: GENETIC COUNSELOR, MS

## 2024-02-02 NOTE — LETTER
2024      RE: Erich Pierce  [address] [facility] [address]     Dear Colleague,    Thank you for the opportunity to participate in the care of your patient, Erich Pierce, at the [facility] at [facility]. Please see a copy of my visit note below.      Name:  Erich Pierce  :   1980  MRN:   [id number]  Date of service: 2024  Primary Provider: Damaso Harris  Referring Provider: Hari Beaulieu    Presenting Information:  Tye, a 43 year old male, was seen for a video visit at the [facility] for evaluation of personal and family history of kidney disease. I met with Tye at the request of Dr. Beaulieu to obtain a family history, to discuss possible genetic contributions to his symptoms, and to obtain informed consent for genetic testing.       Personal History:   Tye was found to have hematuria and proteinuria prior to appendectomy in May 2022.  He came under the care Dr. Kendrick Jackson, undergoing kidney biopsy in May of this year with findings of thin basement membrane disease.  Creatinines since 2019 have fluctuated in the range 1.13 to 1.32.  Spot urine from 2023 estimated 123 mg microalbumin. Refer to Dr. Beaulieu's note for a more detailed personal history.     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  A copy is located in the media tab and full details are available in the pedigree section of the history tab. The following information was provided:     Children:   Daughter (9y) is well. She has not had any urine analysis.  Son (6y) is well. He has not had any urine analysis.  Son (3y) has allergies. He has not had any urine analysis.   Siblings:  Sister (45y) has type 2 diabetes.  She has two daughters who are well.   Maternal Relatives:  Mother (68y) is well to Tye's knowledge (limited contact).  Tye has limited information about his aunts, uncles, and cousins.   Grandmother is . No information available.  Grandfather is . No information available.   Paternal Relatives:  Father passed away at age 64 from AML. He was on dialysis and underwent kidney transplant at age 55. Limited information is available about his initial diagnosis with kidney disease.   Tye has limited information about his aunts, uncles, and cousins.   Grandmother is . No information available.  Grandfather is . No information available.     The family history is otherwise negative for developmental delay, intellectual disability, congenital heart disease, birth defects, hearing loss, vision problems, FSGS, chronic kidney disease, renal failure, dialysis, renal transplant, recurrent kidney stones, kidney cysts, abnormally placed/shaped kidneys, proteinuria, hematuria, abnormal renal biopsy, abnormal renal imaging, recurrent urinary tract/bladder/kidney infections, or genetic testing. Consanguinity is denied.      Pedigree Image    Discussion and Assessment:  Genes are long stretches of DNA that are responsible for how our bodies look and how our bodies work. Our genes are inherited on structures called chromosomes of which we have 23 pairs.  One copy of each chromosome in a pair is inherited from the mother and one is inherited from the father. The first 22 pairs of chromosomes are the same in males and females  while the final pair of chromosomes, the sex chromosomes (X and Y), are different in males and females. Males have one copy of the X-chromosome and one copy of the Y-chromosome while females have two copies of the X-chromosome. Therefore females have two copies of each gene on the X-chromosome while males have just one. Changes in the DNA sequence of a gene, called variants, as well as changes within the chromosomes can cause the signs and symptoms of a genetic condition. It is possible that Tye's personal and family history are due to a genetic condition, such as Alport syndrome.      Alport syndrome is a genetic disorder characterized by kidney disease, hearing loss, and eye abnormalities. Most affected individuals have hematuria and proteinuria which is indicative of decreased kidney function. Progressive kidney dysfunction can lead to end-stage renal disease (age of onset varies by inheritance type and gene). Other features of this condition include onset of sensorineural hearing loss (onset varies by inheritance type), and anterior lenticonus (abnormally shaped eye lenses). Alport syndrome demonstrates clinical variability, even among affected individuals in the same family.       Alport syndrome is caused by pathogenic variants in one of three genes (COL4A3, COL4A4, COL4A5). These genes contribute to the creation of type IV collagen, a protein that plays an important role in kidney function and it is an important component of inner ear structures. Variants in the various type IV collagen genes alter the structure/function of the collagen protein, which in turn causes the features of Alport syndrome including renal dysfunction, hearing loss, and occasionally eye abnormalities.       Genetic testing for Alport syndrome involves next generation sequencing of the a panel of 5 genes associated with Alport syndrome and related conditions to look for single nucleotide misspellings, as well as deletion/duplication  analysis to look for missing or extra chunks of DNA within the gene. The potential results were discussed including a positive, negative, or variant of uncertain significance.     One possibility is a change(s) could be seen in Tye and this change(s) is known to cause similar symptoms to the symptoms Tye has experienced.  This is considered a positive result.  A positive result may provide more information on appropriate clinical management for Tye and may provide information on additional potential health risks associated with Tye's diagnosis.  A positive result can also have implications for the health and reproductive risks of other relatives.  It is also possible that no change(s) that are likely to explain Tye's symptoms are found.  This is considered a negative result.  A negative result would not completely rule out a possible genetic cause for Tye's symptoms.  Not all changes in our genes cause disease.  Sometimes, it can be difficult for the laboratory to determine whether or not a change that is found contributes to the patient's symptoms.  If the meaning of a particular gene change is unknown, the lab classifies the result as a variant of unknown significance (VUS). Follow-up testing of relatives may be beneficial in clarifying the meaning of this result.     It is medically necessary to determine if there is an underlying genetic cause for Tye's symptoms for several reasons. First and foremost this can be important for his own health. It is possible that an underlying cause may also predispose Tye to other health risks. Knowing about these additional health risks can help us stay ahead of Tye's healthcare to more appropriately screen for other complications.  Some diagnoses may also have treatment options. Additionally, discovering an underlying reason may help predict the chance for other family members to have similar healthcare needs. Finally, having a specific underlying diagnosis can  sometimes help individuals receive the services they need to help reach their full potential in school, in work, or in day to day life.     The lab we are using for this test is called [facility]. They will send a buccal kit directly to Tye who will then be instructed to collect the specimen and mail it back to the lab. This kit must be completed and appropriately labelled with name and date of birth.    Insurance and billing procedures were covered with Tye. Tye will be sent a link where he can access [facility]'s billing policy. Yasmine will submit a claim to Tye's insurance. If Tye owes more than $100 after the claim is processed, he will be contacted by Yasmine to discuss billing options including financial assistance.     Tye provided informed consent for the testing. I will plan to follow-up with Tye by phone when results are returned, approximately 2-3 weeks after the testing is initiated. A follow-up appointment will be scheduled as needed. Additional questions or concerns were denied at this time.        Plan:  A buccal swab kit will be sent directly to Tye from the laboratory.    Once the lab receives the sample, Tye's Alport syndrome panel will be initiated.    Results are expected in approximately 2-3 weeks and will be returned by phone. A follow-up appointment will be scheduled as needed at that time.    Contact information was provided should any questions arise in the future.       Suzy Boyd [facility]  Genetic Counselor  CLARA Stauffer   Phone: [phone]     Approximate Time Spent in Consultation: 21 min

## 2024-02-19 ENCOUNTER — MEDICAL CORRESPONDENCE (OUTPATIENT)
Dept: HEALTH INFORMATION MANAGEMENT | Facility: CLINIC | Age: 44
End: 2024-02-19
Payer: COMMERCIAL

## 2024-02-23 ENCOUNTER — TELEPHONE (OUTPATIENT)
Dept: CONSULT | Facility: CLINIC | Age: 44
End: 2024-02-23
Payer: COMMERCIAL

## 2024-02-23 NOTE — TELEPHONE ENCOUNTER
February 23, 2024    I spoke with Tye on the phone to discuss his recent genetic testing on behalf of my colleague, Suzy Boyd.    Reason for Testing  Personal history of hematuria, proteinuria and kidney biopsy with thin basement membrane disease findings and preserved staining for Alport antigens; family history of ESRD in father    Testing Ordered  Alport Syndrome Panel at [facility]    Result  NEGATIVE/normal. Sequence analysis and deletion/duplication testing did not identify any genetic changes that are known or suspected to cause disease.    We discussed that while this result is reassuring, it does not rule out the possibility of a genetic cause for Tye's symptoms as this testing was targeted to Alport Syndrome genes only and there are limitations to our current testing technology.     I advised that I would have Suzy follow up with Tye once she returns to discuss whether additional testing is warranted.    Tye denied questions at this time.    Diandra Guerrero MS, [facility]  Genetic Counseling  [facility]  Pager: [phone]  Phone: [phone]  Fax: [phone]

## 2024-02-27 DIAGNOSIS — T50.905A IMPAIRED GLUCOSE TOLERANCE ASSOCIATED WITH DRUGS: ICD-10-CM

## 2024-02-27 DIAGNOSIS — R80.0 ISOLATED NON-NEPHROTIC PROTEINURIA: ICD-10-CM

## 2024-02-27 DIAGNOSIS — R73.02 IMPAIRED GLUCOSE TOLERANCE ASSOCIATED WITH DRUGS: ICD-10-CM

## 2024-02-27 DIAGNOSIS — M79.10 MYALGIA: ICD-10-CM

## 2024-02-27 DIAGNOSIS — R31.29 MICROSCOPIC HEMATURIA: Primary | ICD-10-CM

## 2024-02-27 DIAGNOSIS — I10 ESSENTIAL HYPERTENSION, MALIGNANT: ICD-10-CM

## 2024-03-06 ENCOUNTER — TELEPHONE (OUTPATIENT)
Dept: NEPHROLOGY | Facility: CLINIC | Age: 44
End: 2024-03-06
Payer: COMMERCIAL

## 2024-03-06 NOTE — TELEPHONE ENCOUNTER
I called Tye to discuss options given his recent genetic testing. He was not available so I left a voicemail with the following information:     Tye's previous genetic testing ruled out the known and testable genetic causes of Alport syndrome. 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 About 15% of families with histories suggestive of Alport syndrome do not have an identifiable change in one of the known genes.     If Tye is interested, we could reflex his testing to a larger panel of genes that are involved in progressive kidney disease. This testing has no additional charge through the laboratory for 150 days following Tye's original report. However, this test may also not be able to find a genetic cause for Tye's history and is more likely to find uncertain results because of the large number of genes. I encouraged Tye to reach out if he would like to proceed with this testing or has any other questions.     Either way, given the family history, it is recommended that Tye's children get routine urine analyses through their primary care providers to monitor them for any signs of kidney disease. They should be referred to nephrology if any abnormalities are detected.     Suzy Boyd MS, [facility]  Genetic Counselor  [facility]  Phone: [phone]

## 2024-03-12 ENCOUNTER — LAB (OUTPATIENT)
Dept: LAB | Facility: CLINIC | Age: 44
End: 2024-03-12
Payer: COMMERCIAL

## 2024-03-12 DIAGNOSIS — E78.1 PURE HYPERGLYCERIDEMIA: Primary | ICD-10-CM

## 2024-03-18 ENCOUNTER — TELEPHONE (OUTPATIENT)
Dept: FAMILY MEDICINE | Facility: CLINIC | Age: 44
End: 2024-03-18
Payer: COMMERCIAL

## 2024-03-18 DIAGNOSIS — R73.02 IMPAIRED GLUCOSE TOLERANCE ASSOCIATED WITH DRUGS: ICD-10-CM

## 2024-03-18 DIAGNOSIS — M79.10 MYALGIA: ICD-10-CM

## 2024-03-18 DIAGNOSIS — T50.905A IMPAIRED GLUCOSE TOLERANCE ASSOCIATED WITH DRUGS: ICD-10-CM

## 2024-03-18 DIAGNOSIS — I10 ESSENTIAL HYPERTENSION, MALIGNANT: ICD-10-CM

## 2024-03-18 DIAGNOSIS — R31.29 MICROSCOPIC HEMATURIA: Primary | ICD-10-CM

## 2024-03-18 NOTE — TELEPHONE ENCOUNTER
ASHLY huddled with lab, they will upload the labs into patients chart         Yulia West    [facility]

## 2024-03-18 NOTE — TELEPHONE ENCOUNTER
TC called spoke with pt informed that labs are in chart       Yulia West    [facility]

## 2024-03-18 NOTE — TELEPHONE ENCOUNTER
"Patient sees a nephrologist outside of  who puts in lab orders. [place] but has had problems receiving these. RN gave fax number. He stated this is the fax that has been sending to.     RN investigated further and appears this was faxed in on 3/5/24. There were several orders from his provider but they are not imported on \"laboratory\".          Patient would like call when these are imported to orders.     Hoda Muhammad RN on 3/18/2024 at 10:31 AM    "

## 2024-03-19 ENCOUNTER — LAB (OUTPATIENT)
Dept: LAB | Facility: CLINIC | Age: 44
End: 2024-03-19
Payer: COMMERCIAL

## 2024-03-19 DIAGNOSIS — T50.905A IMPAIRED GLUCOSE TOLERANCE ASSOCIATED WITH DRUGS: ICD-10-CM

## 2024-03-19 DIAGNOSIS — R73.02 IMPAIRED GLUCOSE TOLERANCE ASSOCIATED WITH DRUGS: ICD-10-CM

## 2024-03-19 DIAGNOSIS — R31.29 MICROSCOPIC HEMATURIA: ICD-10-CM

## 2024-03-19 DIAGNOSIS — M79.10 MYALGIA: ICD-10-CM

## 2024-03-19 DIAGNOSIS — I10 ESSENTIAL HYPERTENSION, MALIGNANT: ICD-10-CM

## 2024-03-19 LAB — HBA1C MFR BLD: 6.1 % (ref 0–5.6)

## 2024-03-19 PROCEDURE — 36415 COLL VENOUS BLD VENIPUNCTURE: CPT

## 2024-03-19 PROCEDURE — 82570 ASSAY OF URINE CREATININE: CPT

## 2024-03-19 PROCEDURE — 83036 HEMOGLOBIN GLYCOSYLATED A1C: CPT

## 2024-03-19 PROCEDURE — 80061 LIPID PANEL: CPT

## 2024-03-19 PROCEDURE — 80069 RENAL FUNCTION PANEL: CPT

## 2024-03-19 PROCEDURE — 82043 UR ALBUMIN QUANTITATIVE: CPT

## 2024-03-20 LAB
ALBUMIN SERPL BCG-MCNC: 4.9 G/DL (ref 3.5–5.2)
ANION GAP SERPL CALCULATED.3IONS-SCNC: 12 MMOL/L (ref 7–15)
BUN SERPL-MCNC: 23.9 MG/DL (ref 6–20)
CALCIUM SERPL-MCNC: 9.8 MG/DL (ref 8.6–10)
CHLORIDE SERPL-SCNC: 104 MMOL/L (ref 98–107)
CHOLEST SERPL-MCNC: 226 MG/DL
CREAT SERPL-MCNC: 1.28 MG/DL (ref 0.67–1.17)
CREAT UR-MCNC: 201 MG/DL
DEPRECATED HCO3 PLAS-SCNC: 26 MMOL/L (ref 22–29)
EGFRCR SERPLBLD CKD-EPI 2021: 71 ML/MIN/1.73M2
FASTING STATUS PATIENT QL REPORTED: YES
GLUCOSE SERPL-MCNC: 88 MG/DL (ref 70–99)
HDLC SERPL-MCNC: 54 MG/DL
LDLC SERPL CALC-MCNC: 132 MG/DL
MICROALBUMIN UR-MCNC: 363 MG/L
MICROALBUMIN/CREAT UR: 180.6 MG/G CR (ref 0–17)
NONHDLC SERPL-MCNC: 172 MG/DL
PHOSPHATE SERPL-MCNC: 3.6 MG/DL (ref 2.5–4.5)
POTASSIUM SERPL-SCNC: 4.1 MMOL/L (ref 3.4–5.3)
SODIUM SERPL-SCNC: 142 MMOL/L (ref 135–145)
TRIGL SERPL-MCNC: 200 MG/DL

## 2024-03-21 DIAGNOSIS — R31.29 MICROSCOPIC HEMATURIA: Primary | ICD-10-CM

## 2024-03-21 DIAGNOSIS — R80.0 ISOLATED NON-NEPHROTIC PROTEINURIA: ICD-10-CM

## 2024-03-21 DIAGNOSIS — I10 ESSENTIAL HYPERTENSION, MALIGNANT: ICD-10-CM

## 2024-03-21 DIAGNOSIS — M79.10 MYALGIA: ICD-10-CM

## 2024-03-21 DIAGNOSIS — T50.905A IMPAIRED GLUCOSE TOLERANCE ASSOCIATED WITH DRUGS: ICD-10-CM

## 2024-03-21 DIAGNOSIS — R73.02 IMPAIRED GLUCOSE TOLERANCE ASSOCIATED WITH DRUGS: ICD-10-CM

## 2024-04-02 NOTE — TELEPHONE ENCOUNTER
I received a call back from Tye stating that he would like to proceed with the larger kidney disease panel. I will place this re-requisition order in the [employer] portal and contact Tye when we receive the results, typically within the next week or two. Tye is encouraged to reach out if any questions come up in the meantime.    Suzy Boyd MS, [facility]  Genetic Counselor  [facility]  Phone: [phone]

## 2024-04-10 ENCOUNTER — TELEPHONE (OUTPATIENT)
Dept: CONSULT | Facility: CLINIC | Age: 44
End: 2024-04-10
Payer: COMMERCIAL

## 2024-04-10 NOTE — TELEPHONE ENCOUNTER
Reason for Call  I called Tye to discuss the results of his genetic testing.    Results  Tye's Progressive Renal Disease Panel was completed at [facility]. These results were nondiagnostic. Several variants that do not establish a molecular diagnosis were identified:   [id number]: c.269G>A (p.Dea01Xoj) heterozygous pathogenic variant   SYNPO: c.2390_2401del (p.Odp688_Btj144yro) heterozygous variant of uncertain significance   Addendum 6/3/24: the SYNPO variant has been reclassified to likely benign    [id number]  Tye was found to have a single pathogenic (disease-causing), change in a gene called [id number]. ACW71K78 is associated with autosomal recessive renal hypouricemia which is characterized by variable kidney stones, hematuria, and pain/nausea after exercise. Some affected individuals do not have symptoms. Renal hypouricemia is inherited in an autosomal recessive way. This means that both copies of a person's JRS47E08 gene need to have pathogenic variants in them in order for a person to show symptoms of the condition. Individuals who only have one pathogenic variant, such as Tye, are called carriers. Carriers generally do not show signs or symptoms of the condition so we are not currently concerned that this change is causing Tye's concerns. However, Tye's children could be at risk of having renal hypouricemia if his partner was also a carrier. Carrier testing is available to Tye's partner to better understand this risk. Alternatively, his children can be monitored for signs of renal hypouricemia.     Other family members also have a chance of being carriers of the WHW01F02 variant and can consider their own genetic testing. If they are in [place] or [place], they can reach out to me and I can help coordinate testing. Alternatively, they can find a local genetic counselor at [email or web address] or go through [facility]'s online genetic counseling and testing at  [email or web address]. Relatives will need a copy of Tye's test report to obtain their own testing.     SYNPO  Tye was also found to have a variant of uncertain significance in the SYNPO gene. SYNPO is a potential risk gene for focal segmental glomerulosclerosis (FSGS). FSGS occurs when scar tissue develops in the filtering unit of the kidney. Symptoms can include blood and protein in the urine, edema, and decreased kidney function. A potential risk gene means that SYNPO has shown some limited association with an increased risk for FSGS, but the association has not been proven. In addition, the variant identified in Tye is a variant of uncertain significance meaning a change was identified in the gene, but the lab does not have enough information about this particular change to know if it could actually cause health concerns or if it is just part of normal variation between people. At this time, we cannot say that this change is responsible for Tye's kidney concerns. Additional familial testing is not expected to clarify these results currently. In the future, we may gain additional information about this gene and variant that may help us better understand whether or not this change is contributing to Tye's health concerns. Addendum 6/3/24: the SYNPO variant has been reclassified to likely benign (likely not disease-causing). Based on this result, we are not concerned that this change is responsible for Tye's concerns.     Other Results  No other disease-causing or uncertain changes were identified in the 195 genes analyzed. This result rules out many potential genetic causes for Tye's concerns. However, it is still possible that his concerns are due to a genetic factor not analyzed by this particular test.     Follow-Up  Tye should continue to follow-up with his nephrologist as indicated. He is encouraged to check in with genetics every few years to see if there have been any  updates to the classification of the SYNPO variant or if there is additional genetic testing that may become available for him. Tye is encouraged to reach out to me if questions come up about these results. I will send a copy of the report to Tye for his own records.      Suzy Boyd MS, [facility]  Genetic Counselor  [facility]  Phone: [phone]

## 2024-04-10 NOTE — LETTER
TO: Erich Pierce  [address] [address]     April 10, 2024    Dear Tye,    This letter is being provided as a summary of your recent genetic testing results. I have also included a copy of the testing report for your own records.     Results  Your Progressive Renal Disease Panel was completed at [facility]. These results were nondiagnostic. Several variants that do not establish a molecular diagnosis were identified:   [id number]: c.269G>A (p.Vuv08Ver) heterozygous pathogenic variant   SYNPO: c.2390_2401del (p.Hoh542_Oae812jke) heterozygous variant of uncertain significance    [id number]  You were found to have a single pathogenic (disease-causing), change in a gene called [id number]. WEI36A04 is associated with autosomal recessive renal hypouricemia which is characterized by variable kidney stones, hematuria, and pain/nausea after exercise. Some affected individuals do not have symptoms. Renal hypouricemia is inherited in an autosomal recessive way. This means that both copies of a person's INV66O65 gene need to have pathogenic variants in them in order for a person to show symptoms of the condition. Individuals who only have one pathogenic variant, such as yourself, are called carriers. Carriers generally do not show signs or symptoms of the condition so we are not currently concerned that this change is causing your concerns. However, your children could be at risk of having renal hypouricemia if your partner was also a carrier. Carrier testing is available to your partner to better understand this risk. Alternatively, your children can be monitored for signs of renal hypouricemia by their primary care providers.     Other family members also have a chance of being carriers of the LCQ01D06 variant and can consider their own genetic testing. If they are in [place] or [place], they can reach out to me and I can help coordinate testing. Alternatively, they can find a local genetic counselor at  [email or web address] or go through [employer]'s online genetic counseling and testing at [email or web address]. Relatives will need a copy of your test report to obtain their own testing.     SYNPO  You were also found to have a variant of uncertain significance in the SYNPO gene. SYNPO is a potential risk gene for focal segmental glomerulosclerosis (FSGS). FSGS occurs when scar tissue develops in the filtering unit of the kidney. Symptoms can include blood and protein in the urine, edema, and decreased kidney function. A potential risk gene means that SYNPO has shown some limited association with an increased risk for FSGS, but the association has not been proven. In addition, the variant identified in you is a variant of uncertain significance meaning a change was identified in the gene, but the lab does not have enough information about this particular change to know if it could actually cause health concerns or if it is just part of normal variation between people. At this time, we cannot say that this change is responsible for your kidney concerns. Additional familial testing is not expected to clarify these results currently. In the future, we may gain additional information about this gene and variant that may help us better understand whether or not this change is contributing to your health concerns.     Other Results  No other disease-causing or uncertain changes were identified in the 195 genes analyzed. This result rules out many potential genetic causes for your concerns. However, due to limitations in knowledge and technology, it is still possible that your concerns are due to a genetic factor not analyzed by this particular test.     Follow-Up  You should continue to follow-up with your nephrologist as indicated. You are encouraged to check in with genetics every few years to see if there have been any updates to the classification of the SYNPO variant or if there is  additional genetic testing that may become available. You are encouraged to reach out to me if questions come up about these results.      Sincerely,    Suzy Boyd MS, [facility]  Genetic Counselor  [facility]  Phone: [phone]

## 2024-04-29 DIAGNOSIS — E78.2 MIXED HYPERLIPIDEMIA: ICD-10-CM

## 2024-04-29 RX ORDER — ROSUVASTATIN CALCIUM 10 MG/1
TABLET, COATED ORAL
Qty: 90 TABLET | Refills: 0 | Status: SHIPPED | OUTPATIENT
Start: 2024-04-29 | End: 2024-09-30

## 2024-04-29 RX ORDER — FENOFIBRATE 200 MG/1
CAPSULE ORAL
Qty: 90 CAPSULE | Refills: 3 | Status: SHIPPED | OUTPATIENT
Start: 2024-04-29

## 2024-08-07 ENCOUNTER — OFFICE VISIT (OUTPATIENT)
Dept: INTERNAL MEDICINE | Facility: CLINIC | Age: 44
End: 2024-08-07
Payer: COMMERCIAL

## 2024-08-07 VITALS
HEART RATE: 61 BPM | TEMPERATURE: 97.9 F | SYSTOLIC BLOOD PRESSURE: 120 MMHG | DIASTOLIC BLOOD PRESSURE: 78 MMHG | WEIGHT: 180 LBS | BODY MASS INDEX: 28.93 KG/M2 | OXYGEN SATURATION: 99 % | RESPIRATION RATE: 16 BRPM | HEIGHT: 66 IN

## 2024-08-07 DIAGNOSIS — R23.1 LIVEDO RETICULARIS: ICD-10-CM

## 2024-08-07 DIAGNOSIS — M67.40 GANGLION CYST: ICD-10-CM

## 2024-08-07 DIAGNOSIS — N02.9 THIN BASEMENT MEMBRANE DISEASE: ICD-10-CM

## 2024-08-07 DIAGNOSIS — G24.5 EYE TWITCH: ICD-10-CM

## 2024-08-07 DIAGNOSIS — Z00.00 ROUTINE GENERAL MEDICAL EXAMINATION AT A HEALTH CARE FACILITY: Primary | ICD-10-CM

## 2024-08-07 LAB
ALBUMIN SERPL BCG-MCNC: 5 G/DL (ref 3.5–5.2)
ALBUMIN UR-MCNC: 100 MG/DL
ALP SERPL-CCNC: 45 U/L (ref 40–150)
ALT SERPL W P-5'-P-CCNC: 45 U/L (ref 0–70)
ANION GAP SERPL CALCULATED.3IONS-SCNC: 9 MMOL/L (ref 7–15)
APPEARANCE UR: CLEAR
AST SERPL W P-5'-P-CCNC: 33 U/L (ref 0–45)
BACTERIA #/AREA URNS HPF: ABNORMAL /HPF
BASOPHILS # BLD AUTO: 0 10E3/UL (ref 0–0.2)
BASOPHILS NFR BLD AUTO: 0 %
BILIRUB SERPL-MCNC: 0.7 MG/DL
BILIRUB UR QL STRIP: NEGATIVE
BUN SERPL-MCNC: 24.6 MG/DL (ref 6–20)
CALCIUM SERPL-MCNC: 9.7 MG/DL (ref 8.8–10.4)
CHLORIDE SERPL-SCNC: 104 MMOL/L (ref 98–107)
CHOLEST SERPL-MCNC: 202 MG/DL
COLOR UR AUTO: YELLOW
CREAT SERPL-MCNC: 1.29 MG/DL (ref 0.67–1.17)
EGFRCR SERPLBLD CKD-EPI 2021: 70 ML/MIN/1.73M2
EOSINOPHIL # BLD AUTO: 0.1 10E3/UL (ref 0–0.7)
EOSINOPHIL NFR BLD AUTO: 1 %
ERYTHROCYTE [DISTWIDTH] IN BLOOD BY AUTOMATED COUNT: 11.5 % (ref 10–15)
FASTING STATUS PATIENT QL REPORTED: YES
FASTING STATUS PATIENT QL REPORTED: YES
GLUCOSE SERPL-MCNC: 102 MG/DL (ref 70–99)
GLUCOSE UR STRIP-MCNC: >=1000 MG/DL
HBA1C MFR BLD: 5.8 % (ref 0–5.6)
HBV CORE AB SERPL QL IA: NONREACTIVE
HBV SURFACE AB SERPL IA-ACNC: <3.5 M[IU]/ML
HBV SURFACE AB SERPL IA-ACNC: NONREACTIVE M[IU]/ML
HBV SURFACE AG SERPL QL IA: NONREACTIVE
HCO3 SERPL-SCNC: 27 MMOL/L (ref 22–29)
HCT VFR BLD AUTO: 49.5 % (ref 40–53)
HCV AB SERPL QL IA: NONREACTIVE
HDLC SERPL-MCNC: 50 MG/DL
HGB BLD-MCNC: 16.2 G/DL (ref 13.3–17.7)
HGB UR QL STRIP: ABNORMAL
HIV 1+2 AB+HIV1 P24 AG SERPL QL IA: NONREACTIVE
IMM GRANULOCYTES # BLD: 0 10E3/UL
IMM GRANULOCYTES NFR BLD: 0 %
KETONES UR STRIP-MCNC: NEGATIVE MG/DL
LDLC SERPL CALC-MCNC: 107 MG/DL
LEUKOCYTE ESTERASE UR QL STRIP: NEGATIVE
LYMPHOCYTES # BLD AUTO: 2.3 10E3/UL (ref 0.8–5.3)
LYMPHOCYTES NFR BLD AUTO: 34 %
MCH RBC QN AUTO: 28.1 PG (ref 26.5–33)
MCHC RBC AUTO-ENTMCNC: 32.7 G/DL (ref 31.5–36.5)
MCV RBC AUTO: 86 FL (ref 78–100)
MONOCYTES # BLD AUTO: 0.4 10E3/UL (ref 0–1.3)
MONOCYTES NFR BLD AUTO: 6 %
NEUTROPHILS # BLD AUTO: 4 10E3/UL (ref 1.6–8.3)
NEUTROPHILS NFR BLD AUTO: 59 %
NITRATE UR QL: NEGATIVE
NONHDLC SERPL-MCNC: 152 MG/DL
PH UR STRIP: 5.5 [PH] (ref 5–8)
PLATELET # BLD AUTO: 218 10E3/UL (ref 150–450)
POTASSIUM SERPL-SCNC: 4 MMOL/L (ref 3.4–5.3)
PROT SERPL-MCNC: 7.9 G/DL (ref 6.4–8.3)
RBC # BLD AUTO: 5.76 10E6/UL (ref 4.4–5.9)
RBC #/AREA URNS AUTO: ABNORMAL /HPF
RHEUMATOID FACT SERPL-ACNC: <10 IU/ML
SODIUM SERPL-SCNC: 140 MMOL/L (ref 135–145)
SP GR UR STRIP: 1.02 (ref 1–1.03)
SQUAMOUS #/AREA URNS AUTO: ABNORMAL /LPF
TRIGL SERPL-MCNC: 223 MG/DL
UROBILINOGEN UR STRIP-ACNC: 0.2 E.U./DL
VIT D+METAB SERPL-MCNC: 106 NG/ML (ref 20–50)
WBC # BLD AUTO: 6.9 10E3/UL (ref 4–11)
WBC #/AREA URNS AUTO: ABNORMAL /HPF

## 2024-08-07 PROCEDURE — 86036 ANCA SCREEN EACH ANTIBODY: CPT | Performed by: INTERNAL MEDICINE

## 2024-08-07 PROCEDURE — 82306 VITAMIN D 25 HYDROXY: CPT | Performed by: INTERNAL MEDICINE

## 2024-08-07 PROCEDURE — 81001 URINALYSIS AUTO W/SCOPE: CPT | Performed by: INTERNAL MEDICINE

## 2024-08-07 PROCEDURE — 86704 HEP B CORE ANTIBODY TOTAL: CPT | Performed by: INTERNAL MEDICINE

## 2024-08-07 PROCEDURE — 86038 ANTINUCLEAR ANTIBODIES: CPT | Performed by: INTERNAL MEDICINE

## 2024-08-07 PROCEDURE — 36415 COLL VENOUS BLD VENIPUNCTURE: CPT | Performed by: INTERNAL MEDICINE

## 2024-08-07 PROCEDURE — 80061 LIPID PANEL: CPT | Performed by: INTERNAL MEDICINE

## 2024-08-07 PROCEDURE — 86706 HEP B SURFACE ANTIBODY: CPT | Performed by: INTERNAL MEDICINE

## 2024-08-07 PROCEDURE — 87389 HIV-1 AG W/HIV-1&-2 AB AG IA: CPT | Performed by: INTERNAL MEDICINE

## 2024-08-07 PROCEDURE — 86160 COMPLEMENT ANTIGEN: CPT | Performed by: INTERNAL MEDICINE

## 2024-08-07 PROCEDURE — 99396 PREV VISIT EST AGE 40-64: CPT | Performed by: INTERNAL MEDICINE

## 2024-08-07 PROCEDURE — 80053 COMPREHEN METABOLIC PANEL: CPT | Performed by: INTERNAL MEDICINE

## 2024-08-07 PROCEDURE — 85025 COMPLETE CBC W/AUTO DIFF WBC: CPT | Performed by: INTERNAL MEDICINE

## 2024-08-07 PROCEDURE — 83036 HEMOGLOBIN GLYCOSYLATED A1C: CPT | Performed by: INTERNAL MEDICINE

## 2024-08-07 PROCEDURE — 87340 HEPATITIS B SURFACE AG IA: CPT | Performed by: INTERNAL MEDICINE

## 2024-08-07 PROCEDURE — 86431 RHEUMATOID FACTOR QUANT: CPT | Performed by: INTERNAL MEDICINE

## 2024-08-07 PROCEDURE — 99214 OFFICE O/P EST MOD 30 MIN: CPT | Mod: 25 | Performed by: INTERNAL MEDICINE

## 2024-08-07 PROCEDURE — 86803 HEPATITIS C AB TEST: CPT | Performed by: INTERNAL MEDICINE

## 2024-08-07 SDOH — HEALTH STABILITY: PHYSICAL HEALTH: ON AVERAGE, HOW MANY DAYS PER WEEK DO YOU ENGAGE IN MODERATE TO STRENUOUS EXERCISE (LIKE A BRISK WALK)?: 0 DAYS

## 2024-08-07 SDOH — HEALTH STABILITY: PHYSICAL HEALTH: ON AVERAGE, HOW MANY MINUTES DO YOU ENGAGE IN EXERCISE AT THIS LEVEL?: 0 MIN

## 2024-08-07 ASSESSMENT — SOCIAL DETERMINANTS OF HEALTH (SDOH): HOW OFTEN DO YOU GET TOGETHER WITH FRIENDS OR RELATIVES?: ONCE A WEEK

## 2024-08-07 ASSESSMENT — PAIN SCALES - GENERAL: PAINLEVEL: MILD PAIN (2)

## 2024-08-07 NOTE — PATIENT INSTRUCTIONS
Patient Education   Preventive Care Advice   This is general advice given by our system to help you stay healthy. However, your care team may have specific advice just for you. Please talk to your care team about your preventive care needs.  Nutrition  Eat 5 or more servings of fruits and vegetables each day.  Try wheat bread, brown rice and whole grain pasta (instead of white bread, rice, and pasta).  Get enough calcium and vitamin D. Check the label on foods and aim for 100% of the RDA (recommended daily allowance).  Lifestyle  Exercise at least 150 minutes each week  (30 minutes a day, 5 days a week).  Do muscle strengthening activities 2 days a week. These help control your weight and prevent disease.  No smoking.  Wear sunscreen to prevent skin cancer.  Have a dental exam and cleaning every 6 months.  Yearly exams  See your health care team every year to talk about:  Any changes in your health.  Any medicines your care team has prescribed.  Preventive care, family planning, and ways to prevent chronic diseases.  Shots (vaccines)   HPV shots (up to age 26), if you've never had them before.  Hepatitis B shots (up to age 59), if you've never had them before.  COVID-19 shot: Get this shot when it's due.  Flu shot: Get a flu shot every year.  Tetanus shot: Get a tetanus shot every 10 years.  Pneumococcal, hepatitis A, and RSV shots: Ask your care team if you need these based on your risk.  Shingles shot (for age 50 and up)  General health tests  Diabetes screening:  Starting at age 35, Get screened for diabetes at least every 3 years.  If you are younger than age 35, ask your care team if you should be screened for diabetes.  Cholesterol test: At age 39, start having a cholesterol test every 5 years, or more often if advised.  Bone density scan (DEXA): At age 50, ask your care team if you should have this scan for osteoporosis (brittle bones).  Hepatitis C: Get tested at least once in your life.  STIs (sexually  transmitted infections)  Before age 24: Ask your care team if you should be screened for STIs.  After age 24: Get screened for STIs if you're at risk. You are at risk for STIs (including HIV) if:  You are sexually active with more than one person.  You don't use condoms every time.  You or a partner was diagnosed with a sexually transmitted infection.  If you are at risk for HIV, ask about PrEP medicine to prevent HIV.  Get tested for HIV at least once in your life, whether you are at risk for HIV or not.  Cancer screening tests  Cervical cancer screening: If you have a cervix, begin getting regular cervical cancer screening tests starting at age 21.  Breast cancer scan (mammogram): If you've ever had breasts, begin having regular mammograms starting at age 40. This is a scan to check for breast cancer.  Colon cancer screening: It is important to start screening for colon cancer at age 45.  Have a colonoscopy test every 10 years (or more often if you're at risk) Or, ask your provider about stool tests like a FIT test every year or Cologuard test every 3 years.  To learn more about your testing options, visit:   .  For help making a decision, visit:   https://bit.ly/pl72786.  Prostate cancer screening test: If you have a prostate, ask your care team if a prostate cancer screening test (PSA) at age 55 is right for you.  Lung cancer screening: If you are a current or former smoker ages 50 to 80, ask your care team if ongoing lung cancer screenings are right for you.  For informational purposes only. Not to replace the advice of your health care provider. Copyright   2023 [employer]. All rights reserved. Clinically reviewed by the [facility] Transitions Program. [employer] [id number] - REV 01/24.

## 2024-08-07 NOTE — PROGRESS NOTES
Preventive Care Visit  [facility]  JOSE LARA MD, Internal Medicine  Aug 7, 2024      1. Routine general medical examination at a health care facility  We discussed hepatitis vaccination.  We discussed COVID and flu vaccination.  He should pursue all of this this fall previously recommended.  Hepatitis B vaccination can be done at his convenience.  He is due for colonoscopy this year as he will be turning 44.  - Colonoscopy Screening  Referral; Future  - Comprehensive metabolic panel (BMP + Alb, Alk Phos, ALT, AST, Total. Bili, TP); Future  - Vitamin D Deficiency; Future  - Hemoglobin A1c; Future  - Lipid panel reflex to direct LDL Fasting; Future  - UA Macroscopic with reflex to Microscopic and Culture - Lab Collect; Future  - CBC with platelets and differential; Future  - Comprehensive metabolic panel (BMP + Alb, Alk Phos, ALT, AST, Total. Bili, TP)  - Vitamin D Deficiency  - Hemoglobin A1c  - Lipid panel reflex to direct LDL Fasting  - UA Macroscopic with reflex to Microscopic and Culture - Lab Collect  - CBC with platelets and differential  - UA Microscopic with Reflex to Culture    2. Thin basement membrane disease  Continue close follow-up with his urologist.  - Comprehensive metabolic panel (BMP + Alb, Alk Phos, ALT, AST, Total. Bili, TP); Future  - Vitamin D Deficiency; Future  - Hemoglobin A1c; Future  - Lipid panel reflex to direct LDL Fasting; Future  - UA Macroscopic with reflex to Microscopic and Culture - Lab Collect; Future  - CBC with platelets and differential; Future  - Comprehensive metabolic panel (BMP + Alb, Alk Phos, ALT, AST, Total. Bili, TP)  - Vitamin D Deficiency  - Hemoglobin A1c  - Lipid panel reflex to direct LDL Fasting  - UA Macroscopic with reflex to Microscopic and Culture - Lab Collect  - CBC with platelets and differential  - UA Microscopic with Reflex to Culture    3. Ganglion cyst  Refer to hand surgery for the ganglion cysts.  - Orthopedic   Referral; Future    4. Eye twitch  He has noted some eye twitching.  I told him this is usually due to stress sleep deprivation or caffeine.  He tells me he has had all of these issues going on recently.    5. Livedo reticularis  I do not know what to make of the rash.  He tells me he feels well.  Will check labs as below.  Possibly due to his underlying renal disease?  Low threshold to refer to dermatology.  He is to let me know if there is any change or if it is persistent.  His kidney biopsy did not show any immune complexes apparently.  - Hepatitis C Screen Reflex to HCV RNA Quant and Genotype; Future  - Hepatitis B surface antigen; Future  - Hepatitis B Surface Antibody; Future  - Hepatitis B core antibody; Future  - HIV Antigen Antibody Combo; Future  - Complement C3; Future  - Complement C4; Future  - Anti Nuclear Kimi IgG by IFA with Reflex; Future  - Rheumatoid factor; Future  - Cryoglobulin quantitative; Future  - ANCA IgG by IFA with Reflex to Titer; Future  - CBC with platelets and differential; Future  - Hepatitis C Screen Reflex to HCV RNA Quant and Genotype  - Hepatitis B surface antigen  - Hepatitis B Surface Antibody  - Hepatitis B core antibody  - HIV Antigen Antibody Combo  - Complement C3  - Complement C4  - Anti Nuclear Kimi IgG by IFA with Reflex  - Rheumatoid factor  - Cryoglobulin quantitative  - ANCA IgG by IFA with Reflex to Titer  - CBC with platelets and differential     Lorie Gamble is a 43 year old, presenting for the following:  Annual Visit (Right eye has been twitching for 1 week.  Discoloration on right inner thigh. He has more ganglion cysts on both wrists. Mild right knee pain.)        8/7/2024     8:54 AM   Additional Questions   Roomed by Trupti ELIZABETH        Health Care Directive  Patient does not have a Health Care Directive or Living Will: Patient states has Advance Directive and will bring in a copy to clinic.    HPI  Tye is a pleasant gentleman with a history of  renal disease felt to be thin basement membrane disease who comes in today for his annual wellness.  Marcel been feeling well although he does have some new concerns.  He has noticed a nonpainful discoloration over his right thigh for the last few weeks.  He is unsure how he got it.  There has been no trauma to the area and no institutional symptoms or symptoms of illness.  Again he does have this kidney disease however.  Follows with nephrology.  Biopsy did confirm the thin basement membrane.  He had genetic testing as part of all of this.  He is now on Jardiance.  Off of lisinopril and losartan.  Kids are all doing well.  Oldest is 10.  Youngest is 3.  He has not had any contact with his mother.  She is still in [place].  Sister is in [facility] and doing well although has been diagnosed with diabetes.            8/7/2024   General Health   How would you rate your overall physical health? Good   Feel stress (tense, anxious, or unable to sleep) To some extent      (!) STRESS CONCERN      8/7/2024   Nutrition   Three or more servings of calcium each day? Yes   Diet: Regular (no restrictions)   How many servings of fruit and vegetables per day? (!) 2-3   How many sweetened beverages each day? 0-1            8/7/2024   Exercise   Days per week of moderate/strenous exercise 0 days   Average minutes spent exercising at this level 0 min      (!) EXERCISE CONCERN      8/7/2024   Social Factors   Frequency of gathering with friends or relatives Once a week   Worry food won't last until get money to buy more No   Food not last or not have enough money for food? No   Do you have housing? (Housing is defined as stable permanent housing and does not include staying ouside in a car, in a tent, in an abandoned building, in an overnight shelter, or couch-surfing.) Yes   Are you worried about losing your housing? No   Lack of transportation? No   Unable to get utilities (heat,electricity)? No            8/7/2024   Dental  "  Dentist two times every year? Yes            8/7/2024   TB Screening   Were you born outside of the [place]? Yes            Today's PHQ-2 Score:       8/7/2024     8:17 AM   PHQ-2 ( 1999 [employer])   Q1: Little interest or pleasure in doing things 0   Q2: Feeling down, depressed or hopeless 0   PHQ-2 Score 0   Q1: Little interest or pleasure in doing things Not at all   Q2: Feeling down, depressed or hopeless Not at all   PHQ-2 Score 0           8/7/2024   Substance Use   Alcohol more than 3/day or more than 7/wk No   Do you use any other substances recreationally? No        Social History     Tobacco Use    Smoking status: Never    Smokeless tobacco: Never           8/7/2024   STI Screening   New sexual partner(s) since last STI/HIV test? No      ASCVD Risk   The 10-year ASCVD risk score (Vernell WOODRUFF, et al., 2019) is: 1.8%    Values used to calculate the score:      Age: 43 years      Sex: Male      Is Non- : No      Diabetic: No      Tobacco smoker: No      Systolic Blood Pressure: 120 mmHg      Is BP treated: No      HDL Cholesterol: 54 mg/dL      Total Cholesterol: 226 mg/dL        8/7/2024   Contraception/Family Planning   Questions about contraception or family planning No           Reviewed and updated as needed this visit by Provider                             Objective    Exam  /78 (BP Location: Left arm, Patient Position: Sitting, Cuff Size: Adult Regular)   Pulse 61   Temp 97.9  F (36.6  C) (Tympanic)   Resp 16   Ht 1.676 m (5' 5.98\")   Wt 81.6 kg (180 lb)   SpO2 99%   BMI 29.07 kg/m     Estimated body mass index is 29.07 kg/m  as calculated from the following:    Height as of this encounter: 1.676 m (5' 5.98\").    Weight as of this encounter: 81.6 kg (180 lb).    Physical Exam  GENERAL: alert and no distress  EYES: Eyes grossly normal to inspection, PERRL and conjunctivae and sclerae normal  HENT: ear canals and TM's normal, nose and mouth without ulcers or " lesions  NECK: no adenopathy, no asymmetry, masses, or scars  RESP: lungs clear to auscultation - no rales, rhonchi or wheezes  CV: regular rate and rhythm, normal S1 S2, no S3 or S4, no murmur, click or rub, no peripheral edema  ABDOMEN: soft, nontender, no hepatosplenomegaly, no masses and bowel sounds normal  MS: Ganglion cysts right wrist.  SKIN: Lacy hyperpigmented rash suggestive of livedo reticularis.  Right medial thigh.  NEURO: Normal strength and tone, mentation intact and speech normal  PSYCH: mentation appears normal, affect normal/bright        Signed Electronically by: JOSE LARA MD

## 2024-08-08 LAB
ANA SER QL IF: NEGATIVE
ANCA AB PATTERN SER IF-IMP: NORMAL
C-ANCA TITR SER IF: NORMAL {TITER}
C3 SERPL-MCNC: 127 MG/DL (ref 81–157)
C4 SERPL-MCNC: 25 MG/DL (ref 13–39)

## 2024-08-12 ENCOUNTER — LAB (OUTPATIENT)
Dept: LAB | Facility: CLINIC | Age: 44
End: 2024-08-12
Payer: COMMERCIAL

## 2024-08-12 DIAGNOSIS — R23.1 LIVEDO RETICULARIS: ICD-10-CM

## 2024-08-19 ENCOUNTER — TELEPHONE (OUTPATIENT)
Dept: INTERNAL MEDICINE | Facility: CLINIC | Age: 44
End: 2024-08-19
Payer: COMMERCIAL

## 2024-08-19 DIAGNOSIS — R23.1 LIVEDO RETICULARIS: Primary | ICD-10-CM

## 2024-08-19 NOTE — TELEPHONE ENCOUNTER
Relayed to patient lab order has been placed.  Patient states [place] lab advised him he can go to their clinic for the lab tomorrow.

## 2024-08-19 NOTE — TELEPHONE ENCOUNTER
Patient calling in stating he had some lab work done at the [place] lab on 8/12 and he is wondering what the results are.    Chart review indicates no labs were drawn during his 8/12 visit. Patient states he did go to the appointment and did give a blood sample.    Writer called and spoke with staff at the [place] lab who states the test was canceled due to insufficient quantity. States they will need a new order and the patient will have to come back to get this redrawn.    Spoke with patient and relayed this information. He verbalized understanding and agrees with the plan. States he would like a call back when the orders are in so he can schedule the lab appointment.

## 2024-08-20 ENCOUNTER — LAB (OUTPATIENT)
Dept: LAB | Facility: CLINIC | Age: 44
End: 2024-08-20
Payer: COMMERCIAL

## 2024-08-20 DIAGNOSIS — R23.1 LIVEDO RETICULARIS: ICD-10-CM

## 2024-08-20 PROCEDURE — 82595 ASSAY OF CRYOGLOBULIN: CPT

## 2024-08-20 PROCEDURE — 36415 COLL VENOUS BLD VENIPUNCTURE: CPT

## 2024-08-26 LAB — CRYOGLOB SER QL: NEGATIVE

## 2024-09-04 NOTE — TELEPHONE ENCOUNTER
DIAGNOSIS: ganglion cyst ROMELIA wrist/no image/BCBS/ortho con    APPOINTMENT DATE: 9/11/24   NOTES STATUS DETAILS   OFFICE NOTE from referring provider Internal 8/7/24 - Damaso Harris MD - Internal Med    MEDICATION LIST Internal

## 2024-09-05 DIAGNOSIS — M67.432 BILATERAL GANGLION CYSTS OF WRISTS: Primary | ICD-10-CM

## 2024-09-05 DIAGNOSIS — M67.431 BILATERAL GANGLION CYSTS OF WRISTS: Primary | ICD-10-CM

## 2024-09-11 ENCOUNTER — PRE VISIT (OUTPATIENT)
Dept: ORTHOPEDICS | Facility: CLINIC | Age: 44
End: 2024-09-11

## 2024-09-11 ENCOUNTER — ANCILLARY PROCEDURE (OUTPATIENT)
Dept: GENERAL RADIOLOGY | Facility: CLINIC | Age: 44
End: 2024-09-11
Attending: ORTHOPAEDIC SURGERY
Payer: COMMERCIAL

## 2024-09-11 ENCOUNTER — OFFICE VISIT (OUTPATIENT)
Dept: ORTHOPEDICS | Facility: CLINIC | Age: 44
End: 2024-09-11
Attending: INTERNAL MEDICINE
Payer: COMMERCIAL

## 2024-09-11 DIAGNOSIS — M67.40 GANGLION CYST: ICD-10-CM

## 2024-09-11 DIAGNOSIS — M67.432 BILATERAL GANGLION CYSTS OF WRISTS: ICD-10-CM

## 2024-09-11 DIAGNOSIS — M67.431 BILATERAL GANGLION CYSTS OF WRISTS: ICD-10-CM

## 2024-09-11 PROCEDURE — 99203 OFFICE O/P NEW LOW 30 MIN: CPT | Mod: GC | Performed by: ORTHOPAEDIC SURGERY

## 2024-09-11 PROCEDURE — 73110 X-RAY EXAM OF WRIST: CPT | Mod: RT | Performed by: RADIOLOGY

## 2024-09-11 NOTE — NURSING NOTE
Reason For Visit:   Chief Complaint   Patient presents with    Consult     Bilateral wrist Ganglion cysts       Primary MD: Damaso Harris W  Ref. MD: Damaso Harris    Age: 43 year old    ?  No      There were no vitals taken for this visit.      Pain Assessment  Patient Currently in Pain: Yes  0-10 Pain Scale: 2  Primary Pain Location: Wrist (Right > Left)  Pain Descriptors: Intermittent, Sore, Discomfort    Hand Dominance Evaluation  Hand Dominance: Right          QuickDASH Assessment      9/10/2024    11:29 PM   QuickDASH Main   1. Open a tight or new jar No difficulty   2. Do heavy household chores (e.g., wash walls, floors) No difficulty   3. Carry a shopping bag or briefcase No difficulty   4. Wash your back No difficulty   5. Use a knife to cut food No difficulty   6. Recreational activities in which you take some force or impact through your arm, shoulder or hand (e.g., golf, hammering, tennis, etc.) Mild difficulty   7. During the past week, to what extent has your arm, shoulder or hand problem interfered with your normal social activities with family, friends, neighbours or groups Slightly   8. During the past week, were you limited in your work or other regular daily activities as a result of your arm, shoulder or hand problem Not limited at all   9. Arm, shoulder or hand pain Mild   10.Tingling (pins and needles) in your arm,shoulder or hand Mild   11. During the past week, how much difficulty have you had sleeping because of the pain in your arm, shoulder or hand No difficulty   Quickdash Ability Score 9.09          Current Outpatient Medications   Medication Sig Dispense Refill    cetirizine (ZYRTEC) 10 MG tablet Take 10 mg by mouth daily      cholecalciferol, vitamin D3, 125 mcg (5,000 unit) capsule [CHOLECALCIFEROL, VITAMIN D3, 125 MCG (5,000 UNIT) CAPSULE] Take 5,000 Units by mouth daily.      empagliflozin (JARDIANCE) 10 MG TABS tablet       fenofibrate micronized (LOFIBRA) 200 MG capsule  TAKE 1 CAPSULE (200 MG TOTAL) BY MOUTH DAILY BEFORE BREAKFAST. STRENGTH: 200 MG 90 capsule 3    loratadine (CLARITIN) 10 MG tablet Take 10 mg by mouth daily      Multiple Vitamin (MULTIVITAMIN ADULT PO) Take 1 tablet by mouth daily      rosuvastatin (CRESTOR) 10 MG tablet TAKE 1 TABLET (10 MG TOTAL) BY MOUTH AT BEDTIME. STRENGTH: 10 MG 90 tablet 0       No Known Allergies    GALA TIPTON, ATC

## 2024-09-11 NOTE — LETTER
9/11/2024      Erich Pierce  [address] [facility] [address]      Dear Colleague,    Thank you for referring your patient, Erich Pierce, to the [facility] [place]. Please see a copy of my visit note below.    Orthopaedic Surgery Consultation  9/11/2024 9:11 AM   by Kendrick Larose MD    Erich Pierce MRN# [id number]   Age: 43 year old YOB: 1980     Reason for consult: B/l ganglion cyst                       Assessment and Plan:   Assessment:   RHD 43M with b/l volar wrist ganglions. Discussed the natural history of this condition including the treatment options of continued observation, aspiration, and excision. As the cyst is minimally bothersome, the patient would like to proceed with continued observation at this time. He will follow up if the cysts become more bothersome and he would like to proceed with an intervention.       Plan:   -F/up prn              History of Present Illness:   RHD 43 year old male with b/l ganglion cysts. Reports they both appeared about 1 year ago. They are located on his volar wrist and fluctuate in size. The right one is more annoying than the left while writing and typing. Generally not painful, but endorses throbbing. No numbness, tingling.            Past Medical History:     Patient Active Problem List   Diagnosis     Essential Hypertriglyceridemia     Serum Enzyme Levels - ALT (SGPT) Elevated     Multiple food allergies     Multiple environmental allergies     Vitamin D deficiency     Family history of renal failure     Thin basement membrane disease     Past Medical History:   Diagnosis Date     Acute appendicitis, unspecified acute appendicitis type 03/18/2022     Chronic kidney disease      Essential hypertriglyceridemia      Grumbling appendix 03/28/2022    Added automatically from request for surgery [id number]     Pain, joint, shoulder      Temporal arteritis (H)             Past Surgical History:   Relevant surgical  history as mentioned in HPI.  Past Surgical History:   Procedure Laterality Date     DAVINCI APPENDECTOMY N/A 5/9/2022    Procedure: APPENDECTOMY, ROBOT-ASSISTED;  Surgeon: Gerardo Sidhu MD;  Location: [facility] OR     SKIN GRAFT  1990     WISDOM TOOTH EXTRACTION  2006            Social History:     Social History     Socioeconomic History     Marital status:      Spouse name: Not on file     Number of children: Not on file     Years of education: Not on file     Highest education level: Not on file   Occupational History     Not on file   Tobacco Use     Smoking status: Never     Smokeless tobacco: Never   Substance and Sexual Activity     Alcohol use: Not on file     Drug use: Not on file     Sexual activity: Not on file   Other Topics Concern     Not on file   Social History Narrative     Not on file     Social Determinants of Health     Financial Resource Strain: Low Risk  (8/7/2024)    Financial Resource Strain      Within the past 12 months, have you or your family members you live with been unable to get utilities (heat, electricity) when it was really needed?: No   Food Insecurity: Low Risk  (8/7/2024)    Food Insecurity      Within the past 12 months, did you worry that your food would run out before you got money to buy more?: No      Within the past 12 months, did the food you bought just not last and you didn t have money to get more?: No   Transportation Needs: Low Risk  (8/7/2024)    Transportation Needs      Within the past 12 months, has lack of transportation kept you from medical appointments, getting your medicines, non-medical meetings or appointments, work, or from getting things that you need?: No   Physical Activity: Inactive (8/7/2024)    Exercise Vital Sign      Days of Exercise per Week: 0 days      Minutes of Exercise per Session: 0 min   Stress: Stress Concern Present (8/7/2024)    [employer] [place] - Occupational Stress Questionnaire      Feeling of  Stress : To some extent   Social Connections: Unknown (2024)    Social Connection and Isolation Panel [NHANES]      Frequency of Communication with Friends and Family: Not on file      Frequency of Social Gatherings with Friends and Family: Once a week      Attends Gnosticist Services: Not on file      Active Member of Clubs or Organizations: Not on file      Attends Club or Organization Meetings: Not on file      Marital Status: Not on file   Interpersonal Safety: Low Risk  (2024)    Interpersonal Safety      Do you feel physically and emotionally safe where you currently live?: Yes      Within the past 12 months, have you been hit, slapped, kicked or otherwise physically hurt by someone?: No      Within the past 12 months, have you been humiliated or emotionally abused in other ways by your partner or ex-partner?: No   Housing Stability: Low Risk  (2024)    Housing Stability      Do you have housing? : Yes      Are you worried about losing your housing?: No     Smoking, EtOH,        Family History:     Family History   Problem Relation Age of Onset     Kidney Disease Father         transplant age 55     Leukemia Father          age 64 of acute myelogenous leukimia     Diabetes Type 2  Sister      Allergies Son      No history of problems with bleeding or anesthesia       Allergies:   No Known Allergies       Medications:     Current Outpatient Medications   Medication Sig Dispense Refill     cetirizine (ZYRTEC) 10 MG tablet Take 10 mg by mouth daily       cholecalciferol, vitamin D3, 125 mcg (5,000 unit) capsule [CHOLECALCIFEROL, VITAMIN D3, 125 MCG (5,000 UNIT) CAPSULE] Take 5,000 Units by mouth daily.       empagliflozin (JARDIANCE) 10 MG TABS tablet        fenofibrate micronized (LOFIBRA) 200 MG capsule TAKE 1 CAPSULE (200 MG TOTAL) BY MOUTH DAILY BEFORE BREAKFAST. STRENGTH: 200 MG 90 capsule 3     loratadine (CLARITIN) 10 MG tablet Take 10 mg by mouth daily       Multiple Vitamin  (MULTIVITAMIN ADULT PO) Take 1 tablet by mouth daily       rosuvastatin (CRESTOR) 10 MG tablet TAKE 1 TABLET (10 MG TOTAL) BY MOUTH AT BEDTIME. STRENGTH: 10 MG 90 tablet 0     No current facility-administered medications for this visit.             Review of Systems:   A comprehensive 10 point review of systems (constitutional, ENT, cardiac, peripheral vascular, lymphatic, respiratory, GI, , Musculoskeletal, skin, Neurological) was performed and found to be negative except as described in this note.           Physical Exam:     EXAMINATION pertinent findings:   VITAL SIGNS: There were no vitals taken for this visit.  There is no height or weight on file to calculate BMI.  RESP: non labored breathing   CARD: comfortable, no acute distress  ABD: benign   - Skin intact  - Small, approximately 5 mm volar ganglion cysts palpable bilaterally  - Full wrist, finger ROM, painfree  - SILT throughout hand  - Hand WWP            Data:     All laboratory data reviewed  All imaging studies reviewed by me.  Pertinent Imaging and Lab Review:   No new images    This patient was seen and evaluated by Dr. Larose.     Radha Roa MD  Orthopedic [facility] PGY-4    Total Time = 20 min, 50% of which was spent in counseling and coordination of care as documented above.    Kendrick Larose MD  Orthopedic Surgery, Upper Extremity  Cell [phone]         Again, thank you for allowing me to participate in the care of your patient.        Sincerely,        Kendrick Larose MD

## 2024-09-11 NOTE — PROGRESS NOTES
Orthopaedic Surgery Consultation  9/11/2024 9:11 AM   by Kendrick Larose MD    Erich Pierce MRN# [id number]   Age: 43 year old YOB: 1980     Reason for consult: B/l ganglion cyst                       Assessment and Plan:   Assessment:   RHD 43M with b/l volar wrist ganglions. Discussed the natural history of this condition including the treatment options of continued observation, aspiration, and excision. As the cyst is minimally bothersome, the patient would like to proceed with continued observation at this time. He will follow up if the cysts become more bothersome and he would like to proceed with an intervention.       Plan:   -F/up prn              History of Present Illness:   RHD 43 year old male with b/l ganglion cysts. Reports they both appeared about 1 year ago. They are located on his volar wrist and fluctuate in size. The right one is more annoying than the left while writing and typing. Generally not painful, but endorses throbbing. No numbness, tingling.            Past Medical History:     Patient Active Problem List   Diagnosis    Essential Hypertriglyceridemia    Serum Enzyme Levels - ALT (SGPT) Elevated    Multiple food allergies    Multiple environmental allergies    Vitamin D deficiency    Family history of renal failure    Thin basement membrane disease     Past Medical History:   Diagnosis Date    Acute appendicitis, unspecified acute appendicitis type 03/18/2022    Chronic kidney disease     Essential hypertriglyceridemia     Grumbling appendix 03/28/2022    Added automatically from request for surgery [id number]    Pain, joint, shoulder     Temporal arteritis (H)             Past Surgical History:   Relevant surgical history as mentioned in HPI.  Past Surgical History:   Procedure Laterality Date    DAVINCI APPENDECTOMY N/A 5/9/2022    Procedure: APPENDECTOMY, ROBOT-ASSISTED;  Surgeon: Gerardo Sidhu MD;  Location: [facility] OR    SKIN GRAFT  1990    WISDOM TOOTH EXTRACTION   2006            Social History:     Social History     Socioeconomic History    Marital status:      Spouse name: Not on file    Number of children: Not on file    Years of education: Not on file    Highest education level: Not on file   Occupational History    Not on file   Tobacco Use    Smoking status: Never    Smokeless tobacco: Never   Substance and Sexual Activity    Alcohol use: Not on file    Drug use: Not on file    Sexual activity: Not on file   Other Topics Concern    Not on file   Social History Narrative    Not on file     Social Determinants of Health     Financial Resource Strain: Low Risk  (8/7/2024)    Financial Resource Strain     Within the past 12 months, have you or your family members you live with been unable to get utilities (heat, electricity) when it was really needed?: No   Food Insecurity: Low Risk  (8/7/2024)    Food Insecurity     Within the past 12 months, did you worry that your food would run out before you got money to buy more?: No     Within the past 12 months, did the food you bought just not last and you didn t have money to get more?: No   Transportation Needs: Low Risk  (8/7/2024)    Transportation Needs     Within the past 12 months, has lack of transportation kept you from medical appointments, getting your medicines, non-medical meetings or appointments, work, or from getting things that you need?: No   Physical Activity: Inactive (8/7/2024)    Exercise Vital Sign     Days of Exercise per Week: 0 days     Minutes of Exercise per Session: 0 min   Stress: Stress Concern Present (8/7/2024)    [place] [employer] - Occupational Stress Questionnaire     Feeling of Stress : To some extent   Social Connections: Unknown (8/7/2024)    Social Connection and Isolation Panel [NHANES]     Frequency of Communication with Friends and Family: Not on file     Frequency of Social Gatherings with Friends and Family: Once a week     Attends Jain Services: Not  on file     Active Member of Clubs or Organizations: Not on file     Attends Club or Organization Meetings: Not on file     Marital Status: Not on file   Interpersonal Safety: Low Risk  (2024)    Interpersonal Safety     Do you feel physically and emotionally safe where you currently live?: Yes     Within the past 12 months, have you been hit, slapped, kicked or otherwise physically hurt by someone?: No     Within the past 12 months, have you been humiliated or emotionally abused in other ways by your partner or ex-partner?: No   Housing Stability: Low Risk  (2024)    Housing Stability     Do you have housing? : Yes     Are you worried about losing your housing?: No     Smoking, EtOH,        Family History:     Family History   Problem Relation Age of Onset    Kidney Disease Father         transplant age 55    Leukemia Father          age 64 of acute myelogenous leukimia    Diabetes Type 2  Sister     Allergies Son      No history of problems with bleeding or anesthesia       Allergies:   No Known Allergies       Medications:     Current Outpatient Medications   Medication Sig Dispense Refill    cetirizine (ZYRTEC) 10 MG tablet Take 10 mg by mouth daily      cholecalciferol, vitamin D3, 125 mcg (5,000 unit) capsule [CHOLECALCIFEROL, VITAMIN D3, 125 MCG (5,000 UNIT) CAPSULE] Take 5,000 Units by mouth daily.      empagliflozin (JARDIANCE) 10 MG TABS tablet       fenofibrate micronized (LOFIBRA) 200 MG capsule TAKE 1 CAPSULE (200 MG TOTAL) BY MOUTH DAILY BEFORE BREAKFAST. STRENGTH: 200 MG 90 capsule 3    loratadine (CLARITIN) 10 MG tablet Take 10 mg by mouth daily      Multiple Vitamin (MULTIVITAMIN ADULT PO) Take 1 tablet by mouth daily      rosuvastatin (CRESTOR) 10 MG tablet TAKE 1 TABLET (10 MG TOTAL) BY MOUTH AT BEDTIME. STRENGTH: 10 MG 90 tablet 0     No current facility-administered medications for this visit.             Review of Systems:   A comprehensive 10 point review of systems  (constitutional, ENT, cardiac, peripheral vascular, lymphatic, respiratory, GI, , Musculoskeletal, skin, Neurological) was performed and found to be negative except as described in this note.           Physical Exam:     EXAMINATION pertinent findings:   VITAL SIGNS: There were no vitals taken for this visit.  There is no height or weight on file to calculate BMI.  RESP: non labored breathing   CARD: comfortable, no acute distress  ABD: benign   - Skin intact  - Small, approximately 5 mm volar ganglion cysts palpable bilaterally  - Full wrist, finger ROM, painfree  - SILT throughout hand  - Hand WWP            Data:     All laboratory data reviewed  All imaging studies reviewed by me.  Pertinent Imaging and Lab Review:   No new images    This patient was seen and evaluated by Dr. Larose.     Radha Roa MD  Orthopedic [facility] PGY-4    Total Time = 20 min, 50% of which was spent in counseling and coordination of care as documented above.    Kendrick Larose MD  Orthopedic Surgery, Upper Extremity  Cell [phone]

## 2024-09-16 ENCOUNTER — MYC MEDICAL ADVICE (OUTPATIENT)
Dept: INTERNAL MEDICINE | Facility: CLINIC | Age: 44
End: 2024-09-16
Payer: COMMERCIAL

## 2024-09-16 DIAGNOSIS — R23.1 LIVEDO RETICULARIS: ICD-10-CM

## 2024-09-16 DIAGNOSIS — M25.561 ACUTE PAIN OF RIGHT KNEE: Primary | ICD-10-CM

## 2024-09-18 ENCOUNTER — PATIENT OUTREACH (OUTPATIENT)
Dept: CARE COORDINATION | Facility: CLINIC | Age: 44
End: 2024-09-18
Payer: COMMERCIAL

## 2024-09-19 ENCOUNTER — TELEPHONE (OUTPATIENT)
Dept: DERMATOLOGY | Facility: CLINIC | Age: 44
End: 2024-09-19
Payer: COMMERCIAL

## 2024-09-19 NOTE — TELEPHONE ENCOUNTER
This encounter is being sent to inform the clinic that this patient has a referral from Damaso Harris MD in [facility] for the diagnoses of Livedo reticularis, Rash and has requested that this patient be seen within 1-2 weeks.  Based on the availability of our provider(s), we are unable to accommodate this request.    Were all sites offered this patient?  Yes  Patient prefers [facility]    Does scheduling algorithm request to schedule next available?  Patient has been scheduled for the first available opening with Lupe Guzman APRN CNP on 5/2/25.  We have informed the patient that the clinic will review their referral and reach out if a sooner appointment is medically necessary.

## 2024-09-20 NOTE — TELEPHONE ENCOUNTER
Patient Contact    Attempt #1    Was call answered? No    Left a voicemail asking patient to give us a call back at our main dermatology line at [phone]    Darya POLANCO RN BSN  [facility]  [phone]

## 2024-09-24 NOTE — PROGRESS NOTES
ASSESSMENT & PLAN    Tye was seen today for pain.    Diagnoses and all orders for this visit:    Chronic pain of right knee  -     Orthopedic  Referral  -     XR Knee Standing AP Bilat [place] Bilat Lat Right; Future      This issue is chronic and Worsening.      # Right knee pain: Erich Pierce  was seen today for right knee pain. Symptoms had been going on for 1-2 years. On examination there are positive findings of tenderness along the medial joint line and patellar tendon. Imaging findings showed mild osteoarthritis. Likely cause of patient's condition due to osteoarthritis and patellar tendinopathy. Other possible conditions contributing to symptoms include degenerative meniscal tear.  Counseled patient on nature of condition and treatment options.     - Treatment: Activities as tolerated, home exercises given today. Patient deferred formal PT at this time  - Medications/Injections: Over the counter Topical Voltaren gel 3-4 times per day for 2-4 weeks. Avoiding oral NSAIDs for now due to CKD, but could consider short course of this if not improving. Okay to take tylenol 1000mg three times daily as needed as well. Compression, ice as needed    Follow-up: In 4-6 weeks if symptoms do not improve, sooner if worsening  Can consider MRI or steroid injection      Bud Perez MD  [facility] [place]    -----  Chief Complaint   Patient presents with    Right Knee - Pain       SUBJECTIVE  Erich Pierce is a/an 43 year old male who is seen in consultation at the request of  Damaso Harris M.D. for evaluation of right knee pain.     The patient is seen by themselves.    Onset: 1-2 years(s) ago. Reports insidious onset without acute precipitating event.  He reports chronic knee pain with worsening and more frequent pain and swelling recently.  Location of Pain: right medial knee; anterior knee inferior to patella  Worsened by: kneeling, karate, kicking, loaded flexion, stationary  "biking, prolonged sitting, sleeping  Better with: compression sleeve  Treatments tried: rest/activity avoidance; compression sleeve  Associated symptoms: swelling and feeling of instability    Orthopedic/Surgical history: NO  Social History/Occupation: works in IT      REVIEW OF SYSTEMS:  Review of Systems    OBJECTIVE:  BP (!) 153/96   Pulse 69   Ht 1.676 m (5' 6\")   Wt 83.2 kg (183 lb 6.4 oz)   BMI 29.60 kg/m     General: healthy, alert and in no distress  Skin: no suspicious lesions or rash.  CV: distal perfusion intact  Resp: normal respiratory effort without conversational dyspnea   Psych: normal mood and affect  Gait: NORMAL  Neuro: Normal light sensory exam of right lower extremity    RIGHT KNEE  Inspection:    Normal alignment; no edema, erythema, or ecchymosis present  Palpation:    Tender about the patella tendon and medial joint line. Remainder of bony and ligamentous landmarks are nontender.    No effusion is present    Patellofemoral crepitus is Absent  Range of Motion:     00 extension to 1200 flexion  Strength:  5/5, not painful    Extensor mechanism intact  Special Tests:    Positive: Thessaly's    Negative: MCL/valgus stress (0 & 30 deg), LCL/varus stress (0 & 30 deg), Lachman's, anterior drawer, posterior drawer       RADIOLOGY:  Final results and radiologist's interpretation, available in the [facility] health record.  Images were reviewed with the patient in the office today.  My personal interpretation of the performed imaging: mild tricompartmental OA       "

## 2024-09-25 ENCOUNTER — ANCILLARY PROCEDURE (OUTPATIENT)
Dept: GENERAL RADIOLOGY | Facility: CLINIC | Age: 44
End: 2024-09-25
Attending: STUDENT IN AN ORGANIZED HEALTH CARE EDUCATION/TRAINING PROGRAM
Payer: COMMERCIAL

## 2024-09-25 ENCOUNTER — OFFICE VISIT (OUTPATIENT)
Dept: ORTHOPEDICS | Facility: CLINIC | Age: 44
End: 2024-09-25
Payer: COMMERCIAL

## 2024-09-25 VITALS
HEIGHT: 66 IN | SYSTOLIC BLOOD PRESSURE: 153 MMHG | DIASTOLIC BLOOD PRESSURE: 96 MMHG | BODY MASS INDEX: 29.47 KG/M2 | WEIGHT: 183.4 LBS | HEART RATE: 69 BPM

## 2024-09-25 DIAGNOSIS — G89.29 CHRONIC PAIN OF RIGHT KNEE: ICD-10-CM

## 2024-09-25 DIAGNOSIS — M25.561 CHRONIC PAIN OF RIGHT KNEE: ICD-10-CM

## 2024-09-25 DIAGNOSIS — M25.561 ACUTE PAIN OF RIGHT KNEE: ICD-10-CM

## 2024-09-25 PROCEDURE — 73562 X-RAY EXAM OF KNEE 3: CPT | Mod: TC | Performed by: STUDENT IN AN ORGANIZED HEALTH CARE EDUCATION/TRAINING PROGRAM

## 2024-09-25 PROCEDURE — 73560 X-RAY EXAM OF KNEE 1 OR 2: CPT | Mod: TC | Performed by: STUDENT IN AN ORGANIZED HEALTH CARE EDUCATION/TRAINING PROGRAM

## 2024-09-25 PROCEDURE — 99204 OFFICE O/P NEW MOD 45 MIN: CPT | Performed by: STUDENT IN AN ORGANIZED HEALTH CARE EDUCATION/TRAINING PROGRAM

## 2024-09-25 NOTE — PATIENT INSTRUCTIONS
# Right knee pain: Erich Pierce  was seen today for right knee pain. Symptoms had been going on for 1-2 years. On examination there are positive findings of tenderness along the medial joint line and patellar tendon. Imaging findings showed mild osteoarthritis. Likely cause of patient's condition due to osteoarthritis and patellar tendinopathy. Other possible conditions contributing to symptoms include degenerative meniscal tear.  Counseled patient on nature of condition and treatment options.     - Treatment: Activities as tolerated, home exercises given today. Patient deferred formal PT at this time  - Medications/Injections: Over the counter Topical Voltaren gel 3-4 times per day for 2-4 weeks. Okay to take tylenol 1000mg three times daily as needed as well. Compressoin, ice as needed    Follow-up: In 4-6 weeks if symptoms do not improve, sooner if worsening  Can consider MRI or steroid injeciton    Please call [phone] and ask for my team if you have any questions or concerns.

## 2024-09-25 NOTE — LETTER
9/25/2024      Erich Pierce  [address] [facility] [address]      Dear Colleague,    Thank you for referring your patient, Erich Pierce, to the [facility] [place]. Please see a copy of my visit note below.    ASSESSMENT & PLAN    Tye was seen today for pain.    Diagnoses and all orders for this visit:    Chronic pain of right knee  -     Orthopedic  Referral  -     XR Knee Standing AP Bilat [place] Bilat Lat Right; Future      This issue is chronic and Worsening.      # Right knee pain: Erich Pierce  was seen today for right knee pain. Symptoms had been going on for 1-2 years. On examination there are positive findings of tenderness along the medial joint line and patellar tendon. Imaging findings showed mild osteoarthritis. Likely cause of patient's condition due to osteoarthritis and patellar tendinopathy. Other possible conditions contributing to symptoms include degenerative meniscal tear.  Counseled patient on nature of condition and treatment options.     - Treatment: Activities as tolerated, home exercises given today. Patient deferred formal PT at this time  - Medications/Injections: Over the counter Topical Voltaren gel 3-4 times per day for 2-4 weeks. Avoiding oral NSAIDs for now due to CKD, but could consider short course of this if not improving. Okay to take tylenol 1000mg three times daily as needed as well. Compression, ice as needed    Follow-up: In 4-6 weeks if symptoms do not improve, sooner if worsening  Can consider MRI or steroid injection      Bud Perez MD  [facility] [place]    -----  Chief Complaint   Patient presents with     Right Knee - Pain       SUBJECTIVE  Erich Pierce is a/an 43 year old male who is seen in consultation at the request of  Damaso Harris M.D. for evaluation of right knee pain.     The patient is seen by themselves.    Onset: 1-2 years(s) ago. Reports insidious onset without acute  "precipitating event.  He reports chronic knee pain with worsening and more frequent pain and swelling recently.  Location of Pain: right medial knee; anterior knee inferior to patella  Worsened by: kneeling, karate, kicking, loaded flexion, stationary biking, prolonged sitting, sleeping  Better with: compression sleeve  Treatments tried: rest/activity avoidance; compression sleeve  Associated symptoms: swelling and feeling of instability    Orthopedic/Surgical history: NO  Social History/Occupation: works in IT      REVIEW OF SYSTEMS:  Review of Systems    OBJECTIVE:  BP (!) 153/96   Pulse 69   Ht 1.676 m (5' 6\")   Wt 83.2 kg (183 lb 6.4 oz)   BMI 29.60 kg/m     General: healthy, alert and in no distress  Skin: no suspicious lesions or rash.  CV: distal perfusion intact  Resp: normal respiratory effort without conversational dyspnea   Psych: normal mood and affect  Gait: NORMAL  Neuro: Normal light sensory exam of right lower extremity    RIGHT KNEE  Inspection:    Normal alignment; no edema, erythema, or ecchymosis present  Palpation:    Tender about the patella tendon and medial joint line. Remainder of bony and ligamentous landmarks are nontender.    No effusion is present    Patellofemoral crepitus is Absent  Range of Motion:     00 extension to 1200 flexion  Strength:  5/5, not painful    Extensor mechanism intact  Special Tests:    Positive: Thessaly's    Negative: MCL/valgus stress (0 & 30 deg), LCL/varus stress (0 & 30 deg), Lachman's, anterior drawer, posterior drawer       RADIOLOGY:  Final results and radiologist's interpretation, available in the [facility] health record.  Images were reviewed with the patient in the office today.  My personal interpretation of the performed imaging: mild tricompartmental OA           Again, thank you for allowing me to participate in the care of your patient.        Sincerely,        Bud Perez MD  "

## 2024-09-26 NOTE — TELEPHONE ENCOUNTER
Patient Contact    Attempt #2    Was call answered? No    Left a voicemail asking patient to give us a call back at our main dermatology line at [phone] ([facility])    Darya POLANCO RN BSN  [facility]  [phone]

## 2024-09-27 NOTE — TELEPHONE ENCOUNTER
Patient called and scheduled to see Radha Ling PA-C on December 4, 2024 at 9am [facility].    Cancelled previous appointment on 5/2/24 with Fannie Guzman.

## 2024-09-28 DIAGNOSIS — E78.2 MIXED HYPERLIPIDEMIA: ICD-10-CM

## 2024-09-30 RX ORDER — ROSUVASTATIN CALCIUM 10 MG/1
TABLET, COATED ORAL
Qty: 90 TABLET | Refills: 2 | Status: SHIPPED | OUTPATIENT
Start: 2024-09-30

## 2024-12-11 NOTE — TELEPHONE ENCOUNTER
"Called and spoke with patient.  Message below from PCP relayed regarding kidney function. Patient states he has been feeling fatigued but states he and his family have all been \"kind of sick\" and its been going on around the same time he started the new medication.  Patient denies any other symptoms besides feeling fatigued.  Patient states he will check his BP at the grocery store and report it back via [employer]Light[employer].  Writer informed patient this information would be sent to PCP for review.   "
----- Message from Damaso Harris MD sent at 6/17/2022  9:05 AM CDT -----  Team - please call patient with results.    Kidney function looks good on the new medicine.  How are you tolerating it?  Any issues?  Were you able to get your blood pressure checked?      Please schedule him a BP check if he hasn't had it done yet.  Thanks!    
Health Care Proxy

## 2025-05-21 DIAGNOSIS — E78.2 MIXED HYPERLIPIDEMIA: ICD-10-CM

## 2025-05-21 RX ORDER — FENOFIBRATE 200 MG/1
CAPSULE ORAL
Qty: 90 CAPSULE | Refills: 0 | Status: SHIPPED | OUTPATIENT
Start: 2025-05-21

## 2025-05-22 ENCOUNTER — TRANSFERRED RECORDS (OUTPATIENT)
Dept: HEALTH INFORMATION MANAGEMENT | Facility: CLINIC | Age: 45
End: 2025-05-22
Payer: COMMERCIAL

## 2025-08-06 SDOH — HEALTH STABILITY: PHYSICAL HEALTH: ON AVERAGE, HOW MANY MINUTES DO YOU ENGAGE IN EXERCISE AT THIS LEVEL?: 0 MIN

## 2025-08-06 SDOH — HEALTH STABILITY: PHYSICAL HEALTH: ON AVERAGE, HOW MANY DAYS PER WEEK DO YOU ENGAGE IN MODERATE TO STRENUOUS EXERCISE (LIKE A BRISK WALK)?: 0 DAYS

## 2025-08-06 ASSESSMENT — SOCIAL DETERMINANTS OF HEALTH (SDOH): HOW OFTEN DO YOU GET TOGETHER WITH FRIENDS OR RELATIVES?: ONCE A WEEK

## 2025-08-07 ENCOUNTER — OFFICE VISIT (OUTPATIENT)
Dept: INTERNAL MEDICINE | Facility: CLINIC | Age: 45
End: 2025-08-07
Attending: INTERNAL MEDICINE
Payer: COMMERCIAL

## 2025-08-07 VITALS
TEMPERATURE: 97.7 F | DIASTOLIC BLOOD PRESSURE: 83 MMHG | OXYGEN SATURATION: 99 % | BODY MASS INDEX: 29.62 KG/M2 | SYSTOLIC BLOOD PRESSURE: 123 MMHG | HEIGHT: 66 IN | RESPIRATION RATE: 11 BRPM | HEART RATE: 61 BPM

## 2025-08-07 DIAGNOSIS — E78.1 PURE HYPERGLYCERIDEMIA: Primary | ICD-10-CM

## 2025-08-07 DIAGNOSIS — Z00.00 ROUTINE GENERAL MEDICAL EXAMINATION AT A HEALTH CARE FACILITY: ICD-10-CM

## 2025-08-07 DIAGNOSIS — N02.9 THIN BASEMENT MEMBRANE DISEASE: ICD-10-CM

## 2025-08-07 DIAGNOSIS — E55.9 VITAMIN D DEFICIENCY: ICD-10-CM

## 2025-08-07 LAB
ALBUMIN SERPL BCG-MCNC: 4.9 G/DL (ref 3.5–5.2)
ALBUMIN UR-MCNC: 30 MG/DL
ALP SERPL-CCNC: 40 U/L (ref 40–150)
ALT SERPL W P-5'-P-CCNC: 46 U/L (ref 0–70)
ANION GAP SERPL CALCULATED.3IONS-SCNC: 12 MMOL/L (ref 7–15)
APPEARANCE UR: CLEAR
AST SERPL W P-5'-P-CCNC: 26 U/L (ref 0–45)
BACTERIA #/AREA URNS HPF: ABNORMAL /HPF
BILIRUB SERPL-MCNC: 0.5 MG/DL
BILIRUB UR QL STRIP: NEGATIVE
BUN SERPL-MCNC: 27.5 MG/DL (ref 6–20)
CALCIUM SERPL-MCNC: 9.9 MG/DL (ref 8.8–10.4)
CHLORIDE SERPL-SCNC: 105 MMOL/L (ref 98–107)
CHOLEST SERPL-MCNC: 200 MG/DL
COLOR UR AUTO: YELLOW
CREAT SERPL-MCNC: 1.26 MG/DL (ref 0.67–1.17)
EGFRCR SERPLBLD CKD-EPI 2021: 72 ML/MIN/1.73M2
ERYTHROCYTE [DISTWIDTH] IN BLOOD BY AUTOMATED COUNT: 11.5 % (ref 10–15)
FASTING STATUS PATIENT QL REPORTED: YES
FASTING STATUS PATIENT QL REPORTED: YES
GLUCOSE SERPL-MCNC: 105 MG/DL (ref 70–99)
GLUCOSE UR STRIP-MCNC: 500 MG/DL
HCO3 SERPL-SCNC: 25 MMOL/L (ref 22–29)
HCT VFR BLD AUTO: 49.6 % (ref 40–53)
HDLC SERPL-MCNC: 51 MG/DL
HGB BLD-MCNC: 16.6 G/DL (ref 13.3–17.7)
HGB UR QL STRIP: ABNORMAL
KETONES UR STRIP-MCNC: NEGATIVE MG/DL
LDLC SERPL CALC-MCNC: 98 MG/DL
LEUKOCYTE ESTERASE UR QL STRIP: NEGATIVE
MCH RBC QN AUTO: 28.8 PG (ref 26.5–33)
MCHC RBC AUTO-ENTMCNC: 33.5 G/DL (ref 31.5–36.5)
MCV RBC AUTO: 86 FL (ref 78–100)
NITRATE UR QL: NEGATIVE
NONHDLC SERPL-MCNC: 149 MG/DL
PH UR STRIP: 6 [PH] (ref 5–8)
PLATELET # BLD AUTO: 220 10E3/UL (ref 150–450)
POTASSIUM SERPL-SCNC: 4.2 MMOL/L (ref 3.4–5.3)
PROT SERPL-MCNC: 7.6 G/DL (ref 6.4–8.3)
RBC # BLD AUTO: 5.77 10E6/UL (ref 4.4–5.9)
RBC #/AREA URNS AUTO: ABNORMAL /HPF
SODIUM SERPL-SCNC: 142 MMOL/L (ref 135–145)
SP GR UR STRIP: >=1.03 (ref 1–1.03)
SQUAMOUS #/AREA URNS AUTO: ABNORMAL /LPF
TRIGL SERPL-MCNC: 257 MG/DL
UROBILINOGEN UR STRIP-ACNC: 0.2 E.U./DL
VIT D+METAB SERPL-MCNC: 35 NG/ML (ref 20–50)
WBC # BLD AUTO: 7.1 10E3/UL (ref 4–11)
WBC #/AREA URNS AUTO: ABNORMAL /HPF

## 2025-08-07 ASSESSMENT — PAIN SCALES - GENERAL: PAINLEVEL_OUTOF10: NO PAIN (0)

## 2025-08-23 DIAGNOSIS — E78.2 MIXED HYPERLIPIDEMIA: ICD-10-CM

## 2025-08-26 RX ORDER — FENOFIBRATE 200 MG/1
CAPSULE ORAL
Qty: 90 CAPSULE | Refills: 0 | Status: SHIPPED | OUTPATIENT
Start: 2025-08-26

## 2025-08-26 RX ORDER — ROSUVASTATIN CALCIUM 10 MG/1
TABLET, COATED ORAL
Qty: 90 TABLET | Refills: 2 | Status: SHIPPED | OUTPATIENT
Start: 2025-08-26

## (undated) DEVICE — DAVINCI XI FCP BIPOLAR FENESTRATED 470205

## (undated) DEVICE — NDL INSUFFLATION 13GA 120MM C2201

## (undated) DEVICE — PACK LAP CHOLE CUSTOM ASC

## (undated) DEVICE — DAVINCI XI DRAPE COLUMN 470341

## (undated) DEVICE — SU VICRYL 2-0 SH 27" J317H

## (undated) DEVICE — DAVINCI HOT SHEARS TIP COVER  400180

## (undated) DEVICE — ESU PENCIL W/COATED BLADE E2450H

## (undated) DEVICE — BLADE CLIPPER SGL USE 9680

## (undated) DEVICE — DAVINCI XI MONOPOLAR SCISSORS HOT SHEARS 8MM 470179

## (undated) DEVICE — DAVINCI XI NDL DRIVER MEGA SUTURE CUT 8MM 470309

## (undated) DEVICE — DRAPE MAYO STAND 23X54 8337

## (undated) DEVICE — PREP CHLORAPREP 26ML TINTED ORANGE  260815

## (undated) DEVICE — DAVINCI XI SEAL UNIVERSAL 5-8MM 470361

## (undated) DEVICE — SU MONOCRYL 4-0 PS-2 27" UND Y426H

## (undated) DEVICE — SPECIMEN CONTAINER W/10% BUFFERED FORMALIN 120ML 591201

## (undated) DEVICE — DRSG STERI STRIP 1/2X4" R1547

## (undated) DEVICE — SYR 30ML SLIP TIP W/O NDL 302833

## (undated) DEVICE — LINEN TOWEL PACK X5 5464

## (undated) DEVICE — ENDO POUCH UNIVERSAL RETRIEVAL SYSTEM INZII 5MM CD003

## (undated) DEVICE — GLOVE PROTEXIS POWDER FREE SMT 7.5  2D72PT75X

## (undated) DEVICE — ESU GROUND PAD ADULT W/CORD E7507

## (undated) DEVICE — GOWN XLG DISP 9545

## (undated) DEVICE — SU VICRYL 0 CT-2 27" J334H

## (undated) DEVICE — SU STRATAFIX PDS PLUS 3-0 SPIRAL SH 15CM SXPP1B420

## (undated) DEVICE — DAVINCI XI DRAPE ARM 470015

## (undated) RX ORDER — FENTANYL CITRATE 50 UG/ML
INJECTION, SOLUTION INTRAMUSCULAR; INTRAVENOUS
Status: DISPENSED
Start: 2022-05-09

## (undated) RX ORDER — PROPOFOL 10 MG/ML
INJECTION, EMULSION INTRAVENOUS
Status: DISPENSED
Start: 2022-05-09

## (undated) RX ORDER — ONDANSETRON 2 MG/ML
INJECTION INTRAMUSCULAR; INTRAVENOUS
Status: DISPENSED
Start: 2022-05-09

## (undated) RX ORDER — LIDOCAINE HYDROCHLORIDE 20 MG/ML
INJECTION, SOLUTION EPIDURAL; INFILTRATION; INTRACAUDAL; PERINEURAL
Status: DISPENSED
Start: 2022-05-09

## (undated) RX ORDER — GLYCOPYRROLATE 0.2 MG/ML
INJECTION INTRAMUSCULAR; INTRAVENOUS
Status: DISPENSED
Start: 2022-05-09

## (undated) RX ORDER — OXYCODONE HYDROCHLORIDE 5 MG/1
TABLET ORAL
Status: DISPENSED
Start: 2022-05-09

## (undated) RX ORDER — KETOROLAC TROMETHAMINE 30 MG/ML
INJECTION, SOLUTION INTRAMUSCULAR; INTRAVENOUS
Status: DISPENSED
Start: 2022-05-09

## (undated) RX ORDER — BUPIVACAINE HYDROCHLORIDE 2.5 MG/ML
INJECTION, SOLUTION EPIDURAL; INFILTRATION; INTRACAUDAL
Status: DISPENSED
Start: 2022-05-09

## (undated) RX ORDER — CEFAZOLIN SODIUM 1 G/3ML
INJECTION, POWDER, FOR SOLUTION INTRAMUSCULAR; INTRAVENOUS
Status: DISPENSED
Start: 2022-05-09

## (undated) RX ORDER — BUPIVACAINE HYDROCHLORIDE 5 MG/ML
INJECTION, SOLUTION EPIDURAL; INTRACAUDAL
Status: DISPENSED
Start: 2022-05-09

## (undated) RX ORDER — DEXAMETHASONE SODIUM PHOSPHATE 4 MG/ML
INJECTION, SOLUTION INTRA-ARTICULAR; INTRALESIONAL; INTRAMUSCULAR; INTRAVENOUS; SOFT TISSUE
Status: DISPENSED
Start: 2022-05-09